# Patient Record
Sex: FEMALE | Race: WHITE | NOT HISPANIC OR LATINO | URBAN - METROPOLITAN AREA
[De-identification: names, ages, dates, MRNs, and addresses within clinical notes are randomized per-mention and may not be internally consistent; named-entity substitution may affect disease eponyms.]

---

## 2017-08-20 ENCOUNTER — EMERGENCY (EMERGENCY)
Facility: HOSPITAL | Age: 27
LOS: 1 days | End: 2017-08-20
Payer: MEDICAID

## 2017-08-20 PROCEDURE — 99282 EMERGENCY DEPT VISIT SF MDM: CPT | Mod: 25

## 2017-08-29 ENCOUNTER — EMERGENCY (EMERGENCY)
Facility: HOSPITAL | Age: 27
LOS: 1 days | End: 2017-08-29
Payer: MEDICAID

## 2017-08-29 PROCEDURE — 74020: CPT | Mod: 26

## 2017-08-29 PROCEDURE — 99284 EMERGENCY DEPT VISIT MOD MDM: CPT

## 2017-09-01 ENCOUNTER — OUTPATIENT (OUTPATIENT)
Dept: OUTPATIENT SERVICES | Facility: HOSPITAL | Age: 27
LOS: 1 days | End: 2017-09-01
Payer: MEDICAID

## 2017-09-20 DIAGNOSIS — R69 ILLNESS, UNSPECIFIED: ICD-10-CM

## 2017-10-01 PROCEDURE — G9001: CPT

## 2021-09-23 ENCOUNTER — EMERGENCY (EMERGENCY)
Facility: HOSPITAL | Age: 31
LOS: 1 days | End: 2021-09-23
Admitting: EMERGENCY MEDICINE
Payer: COMMERCIAL

## 2021-09-23 PROCEDURE — 99284 EMERGENCY DEPT VISIT MOD MDM: CPT

## 2021-09-23 PROCEDURE — 71046 X-RAY EXAM CHEST 2 VIEWS: CPT | Mod: 26

## 2021-09-23 PROCEDURE — 93010 ELECTROCARDIOGRAM REPORT: CPT

## 2021-09-30 ENCOUNTER — APPOINTMENT (OUTPATIENT)
Dept: CARDIOLOGY | Facility: CLINIC | Age: 31
End: 2021-09-30
Payer: COMMERCIAL

## 2021-09-30 VITALS
HEIGHT: 62 IN | HEART RATE: 72 BPM | DIASTOLIC BLOOD PRESSURE: 70 MMHG | BODY MASS INDEX: 27.6 KG/M2 | SYSTOLIC BLOOD PRESSURE: 98 MMHG | WEIGHT: 150 LBS | OXYGEN SATURATION: 96 %

## 2021-09-30 VITALS — SYSTOLIC BLOOD PRESSURE: 92 MMHG | DIASTOLIC BLOOD PRESSURE: 68 MMHG

## 2021-09-30 DIAGNOSIS — Z78.9 OTHER SPECIFIED HEALTH STATUS: ICD-10-CM

## 2021-09-30 DIAGNOSIS — Z82.49 FAMILY HISTORY OF ISCHEMIC HEART DISEASE AND OTHER DISEASES OF THE CIRCULATORY SYSTEM: ICD-10-CM

## 2021-09-30 DIAGNOSIS — Z87.891 PERSONAL HISTORY OF NICOTINE DEPENDENCE: ICD-10-CM

## 2021-09-30 PROBLEM — Z00.00 ENCOUNTER FOR PREVENTIVE HEALTH EXAMINATION: Status: ACTIVE | Noted: 2021-09-30

## 2021-09-30 PROCEDURE — 99204 OFFICE O/P NEW MOD 45 MIN: CPT

## 2021-09-30 NOTE — HISTORY OF PRESENT ILLNESS
[FreeTextEntry1] : The patient is 31 years old female who had a recent admission to emergency room with near syncope.  She did not lose her consciousness.  She was observed for a few hours.  There was no evidence of acute coronary syndrome.  There was no evidence of any cardiac arrhythmia.  She has a history of cardiac murmur.  She has a history of congenital heart disease.  According to patient she had aortic stenosis as a child and she underwent some procedure for the valve with the balloon.  She is not sure what kind of procedure it was.  She did not have aortic valve replacement.  She is physically active but not exercising on a regular basis.  No history of syncope.  She denies any chest pains or shortness of breath.

## 2021-09-30 NOTE — ASSESSMENT
[FreeTextEntry1] : Recent or near syncope.  She is running low blood pressure.  Could be orthostatic.  Increase hydration discussed with the patient.  Significant murmur on physical exam.  She has a history of congenital heart disease and underwent questionable procedure at age 11.  We will obtain her records from previous cardiologist and review.  Hospital records were reviewed.  I recommend echocardiogram to reevaluate valvular heart disease and overall ejection fraction.  Follow-up after echocardiogram.

## 2021-09-30 NOTE — PHYSICAL EXAM
[Well Developed] : well developed [Well Nourished] : well nourished [No Acute Distress] : no acute distress [Normal Conjunctiva] : normal conjunctiva [Normal Venous Pressure] : normal venous pressure [No Carotid Bruit] : no carotid bruit [Normal S1, S2] : normal S1, S2 [No Rub] : no rub [No Gallop] : no gallop [Clear Lung Fields] : clear lung fields [Good Air Entry] : good air entry [No Respiratory Distress] : no respiratory distress  [Soft] : abdomen soft [Non Tender] : non-tender [No Masses/organomegaly] : no masses/organomegaly [Normal Bowel Sounds] : normal bowel sounds [Normal Gait] : normal gait [No Edema] : no edema [No Cyanosis] : no cyanosis [No Clubbing] : no clubbing [No Varicosities] : no varicosities [No Rash] : no rash [No Skin Lesions] : no skin lesions [Moves all extremities] : moves all extremities [No Focal Deficits] : no focal deficits [Normal Speech] : normal speech [Alert and Oriented] : alert and oriented [Normal memory] : normal memory [Murmur] : murmur [de-identified] : Systolic ejection murmur.

## 2021-10-18 ENCOUNTER — APPOINTMENT (OUTPATIENT)
Dept: CARDIOLOGY | Facility: CLINIC | Age: 31
End: 2021-10-18
Payer: COMMERCIAL

## 2021-10-18 VITALS
WEIGHT: 150 LBS | HEART RATE: 64 BPM | HEIGHT: 62 IN | SYSTOLIC BLOOD PRESSURE: 108 MMHG | BODY MASS INDEX: 27.6 KG/M2 | OXYGEN SATURATION: 99 % | DIASTOLIC BLOOD PRESSURE: 70 MMHG

## 2021-10-18 DIAGNOSIS — R55 SYNCOPE AND COLLAPSE: ICD-10-CM

## 2021-10-18 DIAGNOSIS — R01.1 CARDIAC MURMUR, UNSPECIFIED: ICD-10-CM

## 2021-10-18 PROCEDURE — 93306 TTE W/DOPPLER COMPLETE: CPT

## 2021-10-18 PROCEDURE — 99215 OFFICE O/P EST HI 40 MIN: CPT

## 2021-10-18 NOTE — PHYSICAL EXAM

## 2021-10-18 NOTE — ASSESSMENT
[FreeTextEntry1] : Recent or near syncope.  She is running low blood pressure.  Could be orthostatic.  Increase hydration discussed with the patient.  Significant murmur on physical exam.  She has a history of congenital heart disease and underwent questionable procedure at age 11.  We will obtain her records from previous cardiologist and review.  Hospital records were reviewed. \par Today's echocardiogram was reviewed.  She does have bicuspid aortic valve with severe aortic stenosis.  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  There is no evidence of any significant aortic insufficiency.  Estimated valve area is 0.7 cm² with a peak gradient 95 mmHg.  I discussed further options regarding her aortic stenosis.  She already had symptoms of near syncope.  She needs to have aortic valve replaced.  The options will be decided by cardiothoracic surgeon and the patient.  She is referred for left heart cardiac catheterization prior to surgical evaluation.\par

## 2021-10-19 ENCOUNTER — NON-APPOINTMENT (OUTPATIENT)
Age: 31
End: 2021-10-19

## 2021-10-25 ENCOUNTER — APPOINTMENT (OUTPATIENT)
Dept: DISASTER EMERGENCY | Facility: CLINIC | Age: 31
End: 2021-10-25

## 2021-10-25 ENCOUNTER — OUTPATIENT (OUTPATIENT)
Dept: OUTPATIENT SERVICES | Facility: HOSPITAL | Age: 31
LOS: 1 days | End: 2021-10-25

## 2021-10-26 LAB — SARS-COV-2 N GENE NPH QL NAA+PROBE: NOT DETECTED

## 2021-10-28 ENCOUNTER — OUTPATIENT (OUTPATIENT)
Dept: OUTPATIENT SERVICES | Facility: HOSPITAL | Age: 31
LOS: 1 days | End: 2021-10-28
Payer: COMMERCIAL

## 2021-10-28 PROCEDURE — 93454 CORONARY ARTERY ANGIO S&I: CPT | Mod: 26

## 2021-10-28 PROCEDURE — 93010 ELECTROCARDIOGRAM REPORT: CPT

## 2021-11-01 ENCOUNTER — APPOINTMENT (OUTPATIENT)
Dept: CARDIOTHORACIC SURGERY | Facility: CLINIC | Age: 31
End: 2021-11-01
Payer: COMMERCIAL

## 2021-11-01 ENCOUNTER — APPOINTMENT (OUTPATIENT)
Dept: CARDIOLOGY | Facility: CLINIC | Age: 31
End: 2021-11-01

## 2021-11-01 VITALS
DIASTOLIC BLOOD PRESSURE: 62 MMHG | HEIGHT: 62 IN | HEART RATE: 63 BPM | BODY MASS INDEX: 27.23 KG/M2 | TEMPERATURE: 97.3 F | WEIGHT: 148 LBS | SYSTOLIC BLOOD PRESSURE: 104 MMHG | OXYGEN SATURATION: 99 %

## 2021-11-01 PROCEDURE — 99205 OFFICE O/P NEW HI 60 MIN: CPT

## 2021-11-01 NOTE — REVIEW OF SYSTEMS
[Fever] : no fever [Chills] : no chills [Feeling Poorly] : not feeling poorly [Feeling Tired] : feeling tired [Heart Rate Is Slow] : the heart rate was not slow [Heart Rate Is Fast] : the heart rate was not fast [Chest Pain] : no chest pain [Palpitations] : palpitations [Leg Claudication] : no intermittent leg claudication [Lower Ext Edema] : no lower extremity edema [Suicidal] : not suicidal [Sleep Disturbances] : no sleep disturbances [Anxiety] : no anxiety [Depression] : depression [Change In Personality] : no personality change [Emotional Problems] : no emotional problems [Negative] : Heme/Lymph

## 2021-11-01 NOTE — PHYSICAL EXAM
[General Appearance - Alert] : alert [General Appearance - In No Acute Distress] : in no acute distress [Sclera] : the sclera and conjunctiva were normal [PERRL With Normal Accommodation] : pupils were equal in size, round, and reactive to light [Extraocular Movements] : extraocular movements were intact [Outer Ear] : the ears and nose were normal in appearance [Oropharynx] : the oropharynx was normal [Neck Appearance] : the appearance of the neck was normal [Neck Cervical Mass (___cm)] : no neck mass was observed [Jugular Venous Distention Increased] : there was no jugular-venous distention [Thyroid Diffuse Enlargement] : the thyroid was not enlarged [Thyroid Nodule] : there were no palpable thyroid nodules [Auscultation Breath Sounds / Voice Sounds] : lungs were clear to auscultation bilaterally [Apical Impulse] : the apical impulse was normal [Heart Rate And Rhythm] : heart rate was normal and rhythm regular [Heart Sounds Gallop] : no gallops [Heart Sounds Pericardial Friction Rub] : no pericardial rub [FreeTextEntry1] : normal S1, absent S2. Harsh systolic murmur at the cardiac base [Examination Of The Chest] : the chest was normal in appearance [Right Carotid Bruit] : no bruit heard over the right carotid [Left Carotid Bruit] : no bruit heard over the left carotid [Right Femoral Bruit] : no bruit heard over the right femoral artery [Left Femoral Bruit] : no bruit heard over the left femoral artery [2+] : left 2+ [No Abnormalities] : the abdominal aorta was not enlarged and no bruit was heard [Varicose Veins Of The Right Leg] : the patient has no varicose veins of the right leg [Varicose Veins Of The Left Leg] : the patient has no varicose veins of the left leg [Bowel Sounds] : normal bowel sounds [Abdomen Soft] : soft [Abdomen Tenderness] : non-tender [Abdomen Mass (___ Cm)] : no abdominal mass palpated [Cervical Lymph Nodes Enlarged Posterior Bilaterally] : posterior cervical [Cervical Lymph Nodes Enlarged Anterior Bilaterally] : anterior cervical [Supraclavicular Lymph Nodes Enlarged Bilaterally] : supraclavicular [Axillary Lymph Nodes Enlarged Bilaterally] : axillary [Femoral Lymph Nodes Enlarged Bilaterally] : femoral [Inguinal Lymph Nodes Enlarged Bilaterally] : inguinal [Abnormal Walk] : normal gait [Nail Clubbing] : no clubbing  or cyanosis of the fingernails [Musculoskeletal - Swelling] : no joint swelling seen [Motor Tone] : muscle strength and tone were normal [Skin Color & Pigmentation] : normal skin color and pigmentation [Skin Turgor] : normal skin turgor [] : no rash

## 2021-11-01 NOTE — HISTORY OF PRESENT ILLNESS
[FreeTextEntry1] : Ms. Olivares is a 31 year old female with a PMH including Congential Heart Disease, Murmur, near Syncope, and Severe Aortic Stenosis. \par \par The patient is a very pleasant 31-year-old female.  She states that at the age of 10 she underwent open heart surgery and "some valve procedure.".  Her mother has since passed, and she has no medical records pertaining to the surgery.  She is quite sure however that she did not undergo valve replacement, and she is aware of having a bicuspid aortic valve.  She also reports that she knew "all along that would come to this."  The patient denies exertional dyspnea.  She is a  and walks routinely throughout the day.  She also is very active in his mother, and has a 3-year-old as well as a 4-year-old.  One child has an issue with her pulmonic valve.  The child is doing well however and it "just needs to be monitored."  The patient denies hypertension or hyperlipidemia.  She is a nondiabetic.  She quit smoking 1-1/2 months ago.  She is now experiencing intermittent episodes of near syncope and palpitations.\par \par Echocardiogram was performed on October 18, 2021.  The study was personally reviewed.  The aortic valve was bicuspid with severe stenosis.  Aortic valve area 0.7 cm².  Peak and mean gradients are 113 and 55 mmHg respectively.  There is mild to moderate aortic valve regurgitation.  The mitral valve is normal with minimal regurgitation.  Biventricular function is preserved.  The aortic root measures 3.5 cm and the ascending aorta measures 4.2.  The aortic arch is measured at 3.6 cm.\par \par Cardiac catheterization was performed on October 28, 2021.  The study was also personally reviewed.  The coronary anatomy is right dominant.  The right coronary artery is normal but small, the left-sided circulation shows no disease.  The valve was not crossed for the procedure and there were no gradients.\par \par The patient has not been seen by dentist for many years.  She denies issues with dentition.\par \par The patient's past medical history, past surgical history, family history, social history, allergies, medications, and multisystem review of systems were individually reviewed with the patient.  There are no pertinent additions or subtractions.  The patient was personally seen and examined.

## 2021-11-01 NOTE — ASSESSMENT
[FreeTextEntry1] : The patient is a very pleasant 31-year-old female.  She presents with critical, symptomatic, bicuspid aortic valve stenosis.  She reports that at age 10, she underwent an unknown valve procedure.  She does not have any operative records.  Fluoroscopy clearly show sternal wires.  Echocardiogram shows a valve area 0.7 cm² with a peak gradient of over 100 mmHg.  The planimetry numbers are not accurate.  Catheterization shows a right dominant system without disease.  Aortic valve replacement is clearly indicated.  However additional testing is required.\par \par In the setting of a bicuspid aortic valve, as well as the reoperative setting, a dedicated, gated CT angiogram of chest will be obtained with the assistance of Dr. Otoniel Martínez from radiology.  Aortic root and ascending measurements are essential.  In the setting of a bicuspid aortic valve in such a young patient, I suspect the ascending aorta may also require replacement.  If the echocardiogram measurement is accurate, the ascending aorta is 4.2 cm.  In a petite female in the setting of a bicuspid valve, and at age 31, this requires intervention.\par \par We will obtain the patient's operative records from [facility].  Reviewing his records it is imperative and we will make every effort to gain access to these records.\par \par I have asked the patient to be seen by her dentist for clearance.\par \par The patient would like to come in at the CT scan for further counseling and discussion.\par \par I had a lengthy conversation with the patient regarding the choice of aortic valve replacement.  She is only 31 years of age which is to avoid repeat intervention.  She is therefore requested a mechanical valve.  She fully understands this requires lifelong coumadinization, and that the Coumadin can only be discontinued by medical professional with a heparin bridge.  She also understands she cannot have additional children while on Coumadin.  She reports having a 3-year-old as well as a 4-year-old and, "I am done having children."  However she has not undergone tubal ligation and she remains for tile.  We will have this discussion again in the office when she returns.\par \par The operative procedure, and postoperative recovery were discussed in detail.\par \par Risks benefits and alternatives to transcatheter aortic valve placement were discussed with the patient in detail.  Risks discussed included, but not limited to infection, bleeding, myocardial infarction, cerebrovascular accident, renal failure, vascular injury requiring intervention, cardiac rupture, and death.  In addition, a roughly 5-10% risk of permanent pacemaker requirement was highlighted.   Furthermore, the patient's STS score and its significance were discussed directly with the patient and her boyfiend.\par \par I would like to thank you for referring this patient to my attention and for allowing me to participate in her care.  If there are any further questions, or I can be of any further assistance, please do not hesitate contacting me at any time.

## 2021-11-10 ENCOUNTER — APPOINTMENT (OUTPATIENT)
Dept: CARDIOTHORACIC SURGERY | Facility: CLINIC | Age: 31
End: 2021-11-10
Payer: COMMERCIAL

## 2021-11-10 ENCOUNTER — OUTPATIENT (OUTPATIENT)
Dept: OUTPATIENT SERVICES | Facility: HOSPITAL | Age: 31
LOS: 1 days | End: 2021-11-10
Payer: COMMERCIAL

## 2021-11-10 ENCOUNTER — RESULT REVIEW (OUTPATIENT)
Age: 31
End: 2021-11-10

## 2021-11-10 VITALS
DIASTOLIC BLOOD PRESSURE: 69 MMHG | SYSTOLIC BLOOD PRESSURE: 105 MMHG | RESPIRATION RATE: 16 BRPM | HEART RATE: 61 BPM | OXYGEN SATURATION: 98 %

## 2021-11-10 DIAGNOSIS — I35.0 NONRHEUMATIC AORTIC (VALVE) STENOSIS: ICD-10-CM

## 2021-11-10 PROCEDURE — 99215 OFFICE O/P EST HI 40 MIN: CPT

## 2021-11-10 PROCEDURE — 71275 CT ANGIOGRAPHY CHEST: CPT | Mod: 26

## 2021-11-10 PROCEDURE — 71275 CT ANGIOGRAPHY CHEST: CPT

## 2021-11-10 NOTE — ASSESSMENT
[FreeTextEntry1] : The patient is a very pleasant 31-year-old female.  Her operative report shows that in 2001 she underwent sternotomy and open aortic valve commissurotomy.  She now presents with critical aortic valve stenosis and a bicuspid aortic valve.  As suspected, the ascending aorta is dilated to 4 cm.  When compared to the descending thoracic aorta that measures only 2.1 cm, it is clearly dilated.  I therefore believe that in this 31-year-old female a sending aortic/hemiarch replacement is indicated at the time of surgery.  This was discussed in detail with the patient.  She understands.  I also had a lengthy conversation with the patient about mechanical valve choice versus biologic.  At 31 years of age status post tubal ligation, I strongly recommend a mechanical valve.  A bioprosthetic would not have a very long anticipated longevity and a 31-year-old patient.\par \par The patient reports dental sensitivity and requires dental clearance.  This was also discussed.  We will arrange a surgical date, and in the interim she will be seen by dentist.\par \par Risks benefits and alternatives to aortic valve replacement with ascending/hemiarch or placement were discussed with the patient in detail.  Risks discussed included, but not limited to infection, bleeding, myocardial infarction, cerebrovascular accident, renal failure, vascular injury requiring intervention, cardiac rupture, and death.  In addition, a roughly 5-10% risk of permanent pacemaker requirement was highlighted.   Furthermore, the patient's STS score and its significance were discussed directly with the patient and family.\par \par PLAN:\par - Dental Clearance\par - Pulmonary Function Testing \par - Aortic Valve Replacement \par

## 2021-11-10 NOTE — PHYSICAL EXAM
[Sclera] : the sclera and conjunctiva were normal [PERRL With Normal Accommodation] : pupils were equal in size, round, and reactive to light [Extraocular Movements] : extraocular movements were intact [Neck Appearance] : the appearance of the neck was normal [Neck Cervical Mass (___cm)] : no neck mass was observed [Jugular Venous Distention Increased] : there was no jugular-venous distention [Thyroid Diffuse Enlargement] : the thyroid was not enlarged [Thyroid Nodule] : there were no palpable thyroid nodules [Auscultation Breath Sounds / Voice Sounds] : lungs were clear to auscultation bilaterally [Apical Impulse] : the apical impulse was normal [Heart Rate And Rhythm] : heart rate was normal and rhythm regular [Heart Sounds Gallop] : no gallops [Heart Sounds Pericardial Friction Rub] : no pericardial rub [FreeTextEntry1] : normal S1, absent S2. Harsh systolic murmur at the cardiac base [Examination Of The Chest] : the chest was normal in appearance [Right Carotid Bruit] : no bruit heard over the right carotid [Left Carotid Bruit] : no bruit heard over the left carotid [Right Femoral Bruit] : no bruit heard over the right femoral artery [Left Femoral Bruit] : no bruit heard over the left femoral artery [2+] : left 2+ [No Abnormalities] : the abdominal aorta was not enlarged and no bruit was heard [Varicose Veins Of The Right Leg] : the patient has no varicose veins of the right leg [Varicose Veins Of The Left Leg] : the patient has no varicose veins of the left leg [Bowel Sounds] : normal bowel sounds [Abdomen Soft] : soft [Abdomen Tenderness] : non-tender [Abdomen Mass (___ Cm)] : no abdominal mass palpated [Cervical Lymph Nodes Enlarged Posterior Bilaterally] : posterior cervical [Cervical Lymph Nodes Enlarged Anterior Bilaterally] : anterior cervical [Supraclavicular Lymph Nodes Enlarged Bilaterally] : supraclavicular [Axillary Lymph Nodes Enlarged Bilaterally] : axillary [Femoral Lymph Nodes Enlarged Bilaterally] : femoral [Inguinal Lymph Nodes Enlarged Bilaterally] : inguinal [Abnormal Walk] : normal gait [Nail Clubbing] : no clubbing  or cyanosis of the fingernails [Musculoskeletal - Swelling] : no joint swelling seen [Motor Tone] : muscle strength and tone were normal [Skin Color & Pigmentation] : normal skin color and pigmentation [Skin Turgor] : normal skin turgor [] : no rash

## 2021-11-10 NOTE — HISTORY OF PRESENT ILLNESS
[FreeTextEntry1] : Ms. Olivares is a 31 year old female with a PMH including Congential Heart Disease, Murmur, near Syncope, and Severe Aortic Stenosis. \par \par The patient is a very pleasant 31-year-old female. She states that at the age of 10 she underwent open heart surgery and "some valve procedure.". Her mother has since passed, and she has no medical records pertaining to the surgery. She is quite sure however that she did not undergo valve replacement, and she is aware of having a bicuspid aortic valve. She also reports that she knew "all along that would come to this." The patient denies exertional dyspnea. She is a  and walks routinely throughout the day. She also is very active in his mother, and has a 3-year-old as well as a 4-year-old. One child has an issue with her pulmonic valve. The child is doing well however and it "just needs to be monitored." The patient denies hypertension or hyperlipidemia. She is a nondiabetic. She quit smoking 1-1/2 months ago. She is now experiencing intermittent episodes of near syncope and palpitations.\par \par Echocardiogram was performed on October 18, 2021. The study was personally reviewed. The aortic valve was bicuspid with severe stenosis. Aortic valve area 0.7 cm². Peak and mean gradients are 113 and 55 mmHg respectively. There is mild to moderate aortic valve regurgitation. The mitral valve is normal with minimal regurgitation. Biventricular function is preserved. The aortic root measures 3.5 cm and the ascending aorta measures 4.2. The aortic arch is measured at 3.6 cm.\par \par Cardiac catheterization was performed on October 28, 2021. The study was also personally reviewed. The coronary anatomy is right dominant. The right coronary artery is normal but small, the left-sided circulation shows no disease. The valve was not crossed for the procedure and there were no gradients.\par \par CT angiogram performed today November 10, 2021 was personally reviewed.  The ascending aorta measures 4 cm the level of the main pulmonary artery.  The aorta narrows down to 3.2 cm at the sinotubular junction.  The aortic sinuses measure 3.0 x 2.6 cm and therefore not dilated.  The descending thoracic aorta measures 2.1 cm in maximal diameter.\par \par Since my initial visit with the patient a little little over a week ago, my office was able to obtain her original operative report from February 15, 2001.  This report was reviewed.  The patient underwent an open aortic valvuloplasty under cardiopulmonary bypass.  Standard cannulation was utilized, and total cross-clamp time was 22 minutes.  The dictation shows a totally uneventful operation.\par \par Also since last seeing the patient, she reports having some dental sensitivity.  However she has not seen a dentist as of yet.  She is also now contemplating mechanical valve versus bioprosthetic.  She also further reported to me that previously when she said she was "done with having children," she failed to report that she is also status post tubal ligation.\par \par The patient's past medical history, past surgical history, family history, social history, allergies, medications, and multisystem review of systems were individually reviewed with the patient.  There are no pertinent additions or subtractions.  The patient was personally seen and examined.\par

## 2021-11-18 ENCOUNTER — RESULT REVIEW (OUTPATIENT)
Age: 31
End: 2021-11-18

## 2021-11-18 ENCOUNTER — OUTPATIENT (OUTPATIENT)
Dept: OUTPATIENT SERVICES | Facility: HOSPITAL | Age: 31
LOS: 1 days | End: 2021-11-18
Payer: COMMERCIAL

## 2021-11-18 VITALS
OXYGEN SATURATION: 98 % | HEIGHT: 62 IN | HEART RATE: 66 BPM | RESPIRATION RATE: 20 BRPM | DIASTOLIC BLOOD PRESSURE: 73 MMHG | SYSTOLIC BLOOD PRESSURE: 106 MMHG | WEIGHT: 150.58 LBS | TEMPERATURE: 97 F

## 2021-11-18 DIAGNOSIS — Z98.891 HISTORY OF UTERINE SCAR FROM PREVIOUS SURGERY: Chronic | ICD-10-CM

## 2021-11-18 DIAGNOSIS — I71.2 THORACIC AORTIC ANEURYSM, WITHOUT RUPTURE: ICD-10-CM

## 2021-11-18 DIAGNOSIS — Z29.9 ENCOUNTER FOR PROPHYLACTIC MEASURES, UNSPECIFIED: ICD-10-CM

## 2021-11-18 DIAGNOSIS — Q23.0 CONGENITAL STENOSIS OF AORTIC VALVE: ICD-10-CM

## 2021-11-18 DIAGNOSIS — Z98.890 OTHER SPECIFIED POSTPROCEDURAL STATES: Chronic | ICD-10-CM

## 2021-11-18 DIAGNOSIS — Z01.818 ENCOUNTER FOR OTHER PREPROCEDURAL EXAMINATION: ICD-10-CM

## 2021-11-18 DIAGNOSIS — Z13.89 ENCOUNTER FOR SCREENING FOR OTHER DISORDER: ICD-10-CM

## 2021-11-18 DIAGNOSIS — Z98.51 TUBAL LIGATION STATUS: Chronic | ICD-10-CM

## 2021-11-18 LAB
A1C WITH ESTIMATED AVERAGE GLUCOSE RESULT: 5 % — SIGNIFICANT CHANGE UP (ref 4–5.6)
ALBUMIN SERPL ELPH-MCNC: 4.3 G/DL — SIGNIFICANT CHANGE UP (ref 3.3–5.2)
ALP SERPL-CCNC: 59 U/L — SIGNIFICANT CHANGE UP (ref 40–120)
ALT FLD-CCNC: 16 U/L — SIGNIFICANT CHANGE UP
ANION GAP SERPL CALC-SCNC: 11 MMOL/L — SIGNIFICANT CHANGE UP (ref 5–17)
APPEARANCE UR: CLEAR — SIGNIFICANT CHANGE UP
APTT BLD: 28.8 SEC — SIGNIFICANT CHANGE UP (ref 27.5–35.5)
AST SERPL-CCNC: 17 U/L — SIGNIFICANT CHANGE UP
BACTERIA # UR AUTO: ABNORMAL
BASOPHILS # BLD AUTO: 0.03 K/UL — SIGNIFICANT CHANGE UP (ref 0–0.2)
BASOPHILS NFR BLD AUTO: 0.5 % — SIGNIFICANT CHANGE UP (ref 0–2)
BILIRUB SERPL-MCNC: <0.2 MG/DL — LOW (ref 0.4–2)
BILIRUB UR-MCNC: NEGATIVE — SIGNIFICANT CHANGE UP
BLD GP AB SCN SERPL QL: SIGNIFICANT CHANGE UP
BUN SERPL-MCNC: 7.2 MG/DL — LOW (ref 8–20)
CALCIUM SERPL-MCNC: 8.6 MG/DL — SIGNIFICANT CHANGE UP (ref 8.6–10.2)
CHLORIDE SERPL-SCNC: 104 MMOL/L — SIGNIFICANT CHANGE UP (ref 98–107)
CO2 SERPL-SCNC: 23 MMOL/L — SIGNIFICANT CHANGE UP (ref 22–29)
COLOR SPEC: SIGNIFICANT CHANGE UP
CREAT SERPL-MCNC: 0.48 MG/DL — LOW (ref 0.5–1.3)
DIFF PNL FLD: ABNORMAL
EOSINOPHIL # BLD AUTO: 0 K/UL — SIGNIFICANT CHANGE UP (ref 0–0.5)
EOSINOPHIL NFR BLD AUTO: 0 % — SIGNIFICANT CHANGE UP (ref 0–6)
EPI CELLS # UR: SIGNIFICANT CHANGE UP
ESTIMATED AVERAGE GLUCOSE: 97 MG/DL — SIGNIFICANT CHANGE UP (ref 68–114)
GLUCOSE SERPL-MCNC: 148 MG/DL — HIGH (ref 70–99)
GLUCOSE UR QL: NEGATIVE MG/DL — SIGNIFICANT CHANGE UP
HCG SERPL-ACNC: <4 MIU/ML — SIGNIFICANT CHANGE UP
HCT VFR BLD CALC: 36.6 % — SIGNIFICANT CHANGE UP (ref 34.5–45)
HGB BLD-MCNC: 11.9 G/DL — SIGNIFICANT CHANGE UP (ref 11.5–15.5)
IMM GRANULOCYTES NFR BLD AUTO: 0.2 % — SIGNIFICANT CHANGE UP (ref 0–1.5)
INR BLD: 1.01 RATIO — SIGNIFICANT CHANGE UP (ref 0.88–1.16)
KETONES UR-MCNC: NEGATIVE — SIGNIFICANT CHANGE UP
LEUKOCYTE ESTERASE UR-ACNC: NEGATIVE — SIGNIFICANT CHANGE UP
LYMPHOCYTES # BLD AUTO: 1.37 K/UL — SIGNIFICANT CHANGE UP (ref 1–3.3)
LYMPHOCYTES # BLD AUTO: 21.9 % — SIGNIFICANT CHANGE UP (ref 13–44)
MCHC RBC-ENTMCNC: 29.8 PG — SIGNIFICANT CHANGE UP (ref 27–34)
MCHC RBC-ENTMCNC: 32.5 GM/DL — SIGNIFICANT CHANGE UP (ref 32–36)
MCV RBC AUTO: 91.7 FL — SIGNIFICANT CHANGE UP (ref 80–100)
MONOCYTES # BLD AUTO: 0.52 K/UL — SIGNIFICANT CHANGE UP (ref 0–0.9)
MONOCYTES NFR BLD AUTO: 8.3 % — SIGNIFICANT CHANGE UP (ref 2–14)
MRSA PCR RESULT.: SIGNIFICANT CHANGE UP
NEUTROPHILS # BLD AUTO: 4.32 K/UL — SIGNIFICANT CHANGE UP (ref 1.8–7.4)
NEUTROPHILS NFR BLD AUTO: 69.1 % — SIGNIFICANT CHANGE UP (ref 43–77)
NITRITE UR-MCNC: NEGATIVE — SIGNIFICANT CHANGE UP
NT-PROBNP SERPL-SCNC: 236 PG/ML — SIGNIFICANT CHANGE UP (ref 0–300)
PH UR: 7 — SIGNIFICANT CHANGE UP (ref 5–8)
PLATELET # BLD AUTO: 250 K/UL — SIGNIFICANT CHANGE UP (ref 150–400)
POTASSIUM SERPL-MCNC: 3.9 MMOL/L — SIGNIFICANT CHANGE UP (ref 3.5–5.3)
POTASSIUM SERPL-SCNC: 3.9 MMOL/L — SIGNIFICANT CHANGE UP (ref 3.5–5.3)
PREALB SERPL-MCNC: 22 MG/DL — SIGNIFICANT CHANGE UP (ref 18–38)
PROT SERPL-MCNC: 7.4 G/DL — SIGNIFICANT CHANGE UP (ref 6.6–8.7)
PROT UR-MCNC: 15 MG/DL
PROTHROM AB SERPL-ACNC: 11.7 SEC — SIGNIFICANT CHANGE UP (ref 10.6–13.6)
RBC # BLD: 3.99 M/UL — SIGNIFICANT CHANGE UP (ref 3.8–5.2)
RBC # FLD: 12 % — SIGNIFICANT CHANGE UP (ref 10.3–14.5)
RBC CASTS # UR COMP ASSIST: SIGNIFICANT CHANGE UP /HPF (ref 0–4)
S AUREUS DNA NOSE QL NAA+PROBE: DETECTED
SODIUM SERPL-SCNC: 138 MMOL/L — SIGNIFICANT CHANGE UP (ref 135–145)
SP GR SPEC: 1 — LOW (ref 1.01–1.02)
T3 SERPL-MCNC: 120 NG/DL — SIGNIFICANT CHANGE UP (ref 80–200)
T4 AB SER-ACNC: 7 UG/DL — SIGNIFICANT CHANGE UP (ref 4.5–12)
TSH SERPL-MCNC: 1.61 UIU/ML — SIGNIFICANT CHANGE UP (ref 0.27–4.2)
UROBILINOGEN FLD QL: NEGATIVE MG/DL — SIGNIFICANT CHANGE UP
WBC # BLD: 6.25 K/UL — SIGNIFICANT CHANGE UP (ref 3.8–10.5)
WBC # FLD AUTO: 6.25 K/UL — SIGNIFICANT CHANGE UP (ref 3.8–10.5)
WBC UR QL: SIGNIFICANT CHANGE UP

## 2021-11-18 PROCEDURE — 71046 X-RAY EXAM CHEST 2 VIEWS: CPT

## 2021-11-18 PROCEDURE — 71046 X-RAY EXAM CHEST 2 VIEWS: CPT | Mod: 26

## 2021-11-18 PROCEDURE — G0463: CPT

## 2021-11-18 PROCEDURE — 93010 ELECTROCARDIOGRAM REPORT: CPT

## 2021-11-18 PROCEDURE — 93005 ELECTROCARDIOGRAM TRACING: CPT

## 2021-11-18 RX ORDER — INFLUENZA VIRUS VACCINE 15; 15; 15; 15 UG/.5ML; UG/.5ML; UG/.5ML; UG/.5ML
0.5 SUSPENSION INTRAMUSCULAR ONCE
Refills: 0 | Status: COMPLETED | OUTPATIENT
Start: 2021-11-18 | End: 2021-11-18

## 2021-11-18 RX ORDER — SODIUM CHLORIDE 9 MG/ML
3 INJECTION INTRAMUSCULAR; INTRAVENOUS; SUBCUTANEOUS EVERY 8 HOURS
Refills: 0 | Status: DISCONTINUED | OUTPATIENT
Start: 2021-11-30 | End: 2021-12-09

## 2021-11-18 NOTE — H&P PST ADULT - LAB RESULTS AND INTERPRETATION
abnormal labs reported to TA Grace. Mary Carmen De Souza NP abnormal labs reported to TA Grace. Mary Carmen De Souza NP  11/19/21 13:53 MRSA/MSSA result noted as documented, Ta Grace made aware, urine culture pending. Kayla MS, FNP-BC

## 2021-11-18 NOTE — H&P PST ADULT - ASSESSMENT
32 yo F  LMP PMH of Congential Heart Disease, Bicuspid Aortic Valve, Murmur, near Syncope, and Severe Aortic Stenosis. At the age of 10 patient underwent an open aortic valvuloplasty under cardiopulmonary bypass on 2/15/2001. Patient is now experiencing intermittent episodes of near syncope and palpitations. She denies exertional dyspnea. She is a  and walks routinely throughout the day. She is very active. She quit smoking 1-1/2 months ago. Preop assessment prior to reoperation sternotomy, aortic valve replacement, ascending hemiarch replacement w/Dr Munguia scheduled for 2021    Pt was educated on preop preparation with written and verbal instructions. Pt was educated on NSAIDs, multivitamins and herbals that increase the risk of bleeding and need to be stopped 7 days before procedure. Pt was educated on covid testing and covid prevention, i.e. social distancing, handwashing, mask wearing. Pt verbalized understanding of the above.     OPIOID RISK TOOL    MARGARITA EACH BOX THAT APPLIES AND ADD TOTALS AT THE END    FAMILY HISTORY OF SUBSTANCE ABUSE                 FEMALE         MALE                                                Alcohol                             [  ]1 pt          [  ]3pts                                               Illegal Durgs                     [  ]2 pts        [  ]3pts                                               Rx Drugs                           [  ]4 pts        [  ]4 pts    PERSONAL HISTORY OF SUBSTANCE ABUSE                                                                                          Alcohol                             [  ]3 pts       [  ]3 pts                                               Illegal Drugs                     [  ]4 pts        [  ]4 pts                                               Rx Drugs                           [  ]5 pts        [  ]5 pts    AGE BETWEEN 16-45 YEARS                                      [ x ]1 pt         [  ]1 pt    HISTORY OF PREADOLESCENT   SEXUAL ABUSE                                                             [  ]3 pts        [  ]0pts    PSYCHOLOGICAL DISEASE                     ADD, OCD, Bipolar, Schizophrenia        [  ]2 pts         [  ]2 pts                      Depression                                               [  ]1 pt           [  ]1 pt           SCORING TOTAL   (add numbers and type here)              ( 1 )                                     A score of 3 or lower indicated LOW risk for future opioid abuse  A score of 4 to 7 indicated moderate risk for future opioid abuse  A score of 8 or higher indicates a high risk for opioid abuse    CAPRINI VTE 2.0 SCORE [CLOT updated 2019]    AGE RELATED RISK FACTORS                                                       MOBILITY RELATED FACTORS  [ ] Age 41-60 years                                            (1 Point)                    [ ] Bed rest                                                        (1 Point)  [ ] Age: 61-74 years                                           (2 Points)                  [ ] Plaster cast                                                   (2 Points)  [ ] Age= 75 years                                              (3 Points)                    [ ] Bed bound for more than 72 hours                 (2 Points)    DISEASE RELATED RISK FACTORS                                               GENDER SPECIFIC FACTORS  [ ] Edema in the lower extremities                       (1 Point)              [ ] Pregnancy                                                     (1 Point)  [ ] Varicose veins                                               (1 Point)                     [ ] Post-partum < 6 weeks                                   (1 Point)             [ ] BMI > 25 Kg/m2                                            (1 Point)                     [ ] Hormonal therapy  or oral contraception          (1 Point)                 [ ] Sepsis (in the previous month)                        (1 Point)               [ ] History of pregnancy complications                 (1 point)  [ ] Pneumonia or serious lung disease                                               [ ] Unexplained or recurrent                     (1 Point)           (in the previous month)                               (1 Point)  [ ] Abnormal pulmonary function test                     (1 Point)                 SURGERY RELATED RISK FACTORS  [ ] Acute myocardial infarction                              (1 Point)               [ ]  Section                                             (1 Point)  [ ] Congestive heart failure (in the previous month)  (1 Point)      [ ] Minor surgery                                                  (1 Point)   [ ] Inflammatory bowel disease                             (1 Point)               [ ] Arthroscopic surgery                                        (2 Points)  [ ] Central venous access                                      (2 Points)                [ x] General surgery lasting more than 45 minutes (2 points)  [ ] Malignancy- Present or previous                   (2 Points)                [ ] Elective arthroplasty                                         (5 points)    [ ] Stroke (in the previous month)                          (5 Points)                                                                                                                                                           HEMATOLOGY RELATED FACTORS                                                 TRAUMA RELATED RISK FACTORS  [ ] Prior episodes of VTE                                     (3 Points)                [ ] Fracture of the hip, pelvis, or leg                       (5 Points)  [ ] Positive family history for VTE                         (3 Points)             [ ] Acute spinal cord injury (in the previous month)  (5 Points)  [ ] Prothrombin 00559 A                                     (3 Points)               [ ] Paralysis  (less than 1 month)                             (5 Points)  [ ] Factor V Leiden                                             (3 Points)                  [ ] Multiple Trauma within 1 month                        (5 Points)  [ ] Lupus anticoagulants                                     (3 Points)                                                           [ ] Anticardiolipin antibodies                               (3 Points)                                                       [ ] High homocysteine in the blood                      (3 Points)                                             [ ] Other congenital or acquired thrombophilia      (3 Points)                                                [ ] Heparin induced thrombocytopenia                  (3 Points)                                     Total Score [      2    ] 32 yo F   x2 LMP 10/2021 PMH of Congential Heart Disease, Bicuspid Aortic Valve, Murmur, near Syncope, and Severe Aortic Stenosis. At the age of 10 patient underwent an open aortic valvuloplasty under cardiopulmonary bypass on 2/15/2001. Patient is now experiencing intermittent episodes of near syncope and palpitations. She denies exertional dyspnea. She is a  and walks routinely throughout the day. She quit smoking 1-1/2 months ago. Preop assessment prior to reoperation sternotomy, aortic valve replacement, ascending hemiarch replacement w/Dr Munguia scheduled for 2021    Pt was educated on preop preparation with written and verbal instructions. Pt was educated on NSAIDs, multivitamins and herbals that increase the risk of bleeding and need to be stopped 7 days before procedure. Pt was educated on covid testing and covid prevention, i.e. social distancing, handwashing, mask wearing. Pt verbalized understanding of the above.     OPIOID RISK TOOL    MARGARITA EACH BOX THAT APPLIES AND ADD TOTALS AT THE END    FAMILY HISTORY OF SUBSTANCE ABUSE                 FEMALE         MALE                                                Alcohol                             [  ]1 pt          [  ]3pts                                               Illegal Durgs                     [  ]2 pts        [  ]3pts                                               Rx Drugs                           [  ]4 pts        [  ]4 pts    PERSONAL HISTORY OF SUBSTANCE ABUSE                                                                                          Alcohol                             [  ]3 pts       [  ]3 pts                                               Illegal Drugs                     [  ]4 pts        [  ]4 pts                                               Rx Drugs                           [  ]5 pts        [  ]5 pts    AGE BETWEEN 16-45 YEARS                                      [ x ]1 pt         [  ]1 pt    HISTORY OF PREADOLESCENT   SEXUAL ABUSE                                                             [  ]3 pts        [  ]0pts    PSYCHOLOGICAL DISEASE                     ADD, OCD, Bipolar, Schizophrenia        [  ]2 pts         [  ]2 pts                      Depression                                               [  ]1 pt           [  ]1 pt           SCORING TOTAL   (add numbers and type here)              ( 1 )                                     A score of 3 or lower indicated LOW risk for future opioid abuse  A score of 4 to 7 indicated moderate risk for future opioid abuse  A score of 8 or higher indicates a high risk for opioid abuse    CAPRINI VTE 2.0 SCORE [CLOT updated 2019]    AGE RELATED RISK FACTORS                                                       MOBILITY RELATED FACTORS  [ ] Age 41-60 years                                            (1 Point)                    [ ] Bed rest                                                        (1 Point)  [ ] Age: 61-74 years                                           (2 Points)                  [ ] Plaster cast                                                   (2 Points)  [ ] Age= 75 years                                              (3 Points)                    [ ] Bed bound for more than 72 hours                 (2 Points)    DISEASE RELATED RISK FACTORS                                               GENDER SPECIFIC FACTORS  [ ] Edema in the lower extremities                       (1 Point)              [ ] Pregnancy                                                     (1 Point)  [ ] Varicose veins                                               (1 Point)                     [ ] Post-partum < 6 weeks                                   (1 Point)             [ ] BMI > 25 Kg/m2                                            (1 Point)                     [ ] Hormonal therapy  or oral contraception          (1 Point)                 [ ] Sepsis (in the previous month)                        (1 Point)               [ ] History of pregnancy complications                 (1 point)  [ ] Pneumonia or serious lung disease                                               [ ] Unexplained or recurrent                     (1 Point)           (in the previous month)                               (1 Point)  [ ] Abnormal pulmonary function test                     (1 Point)                 SURGERY RELATED RISK FACTORS  [ ] Acute myocardial infarction                              (1 Point)               [ ]  Section                                             (1 Point)  [ ] Congestive heart failure (in the previous month)  (1 Point)      [ ] Minor surgery                                                  (1 Point)   [ ] Inflammatory bowel disease                             (1 Point)               [ ] Arthroscopic surgery                                        (2 Points)  [ ] Central venous access                                      (2 Points)                [ x] General surgery lasting more than 45 minutes (2 points)  [ ] Malignancy- Present or previous                   (2 Points)                [ ] Elective arthroplasty                                         (5 points)    [ ] Stroke (in the previous month)                          (5 Points)                                                                                                                                                           HEMATOLOGY RELATED FACTORS                                                 TRAUMA RELATED RISK FACTORS  [ ] Prior episodes of VTE                                     (3 Points)                [ ] Fracture of the hip, pelvis, or leg                       (5 Points)  [ ] Positive family history for VTE                         (3 Points)             [ ] Acute spinal cord injury (in the previous month)  (5 Points)  [ ] Prothrombin 16126 A                                     (3 Points)               [ ] Paralysis  (less than 1 month)                             (5 Points)  [ ] Factor V Leiden                                             (3 Points)                  [ ] Multiple Trauma within 1 month                        (5 Points)  [ ] Lupus anticoagulants                                     (3 Points)                                                           [ ] Anticardiolipin antibodies                               (3 Points)                                                       [ ] High homocysteine in the blood                      (3 Points)                                             [ ] Other congenital or acquired thrombophilia      (3 Points)                                                [ ] Heparin induced thrombocytopenia                  (3 Points)                                     Total Score [      2    ] 32 yo F   x2 LMP 10/2021 PMH of Congential Heart Disease, Bicuspid Aortic Valve, Murmur, and Severe Aortic Stenosis. At the age of 10 patient underwent an open aortic valvuloplasty under cardiopulmonary bypass on 2/15/2001. Patient is now experiencing intermittent episodes of near syncope and palpitations. She denies SOB but c/o occasional exertional dyspnea with some chest discomfort. She is a  and walks routinely throughout the day. She quit smoking 1-1/2 months ago. Preop assessment prior to reoperation sternotomy, aortic valve replacement, ascending hemiarch replacement w/Dr Munguia scheduled for 2021    Pt was educated on preop preparation with written and verbal instructions. Pt was educated on NSAIDs, multivitamins and herbals that increase the risk of bleeding and need to be stopped 7 days before procedure. Pt was educated on covid testing and covid prevention, i.e. social distancing, handwashing, mask wearing. Pt verbalized understanding of the above.     OPIOID RISK TOOL    MARGARITA EACH BOX THAT APPLIES AND ADD TOTALS AT THE END    FAMILY HISTORY OF SUBSTANCE ABUSE                 FEMALE         MALE                                                Alcohol                             [  ]1 pt          [  ]3pts                                               Illegal Durgs                     [  ]2 pts        [  ]3pts                                               Rx Drugs                           [  ]4 pts        [  ]4 pts    PERSONAL HISTORY OF SUBSTANCE ABUSE                                                                                          Alcohol                             [  ]3 pts       [  ]3 pts                                               Illegal Drugs                     [  ]4 pts        [  ]4 pts                                               Rx Drugs                           [  ]5 pts        [  ]5 pts    AGE BETWEEN 16-45 YEARS                                      [ x ]1 pt         [  ]1 pt    HISTORY OF PREADOLESCENT   SEXUAL ABUSE                                                             [  ]3 pts        [  ]0pts    PSYCHOLOGICAL DISEASE                     ADD, OCD, Bipolar, Schizophrenia        [  ]2 pts         [  ]2 pts                      Depression                                               [  ]1 pt           [  ]1 pt           SCORING TOTAL   (add numbers and type here)              ( 1 )                                     A score of 3 or lower indicated LOW risk for future opioid abuse  A score of 4 to 7 indicated moderate risk for future opioid abuse  A score of 8 or higher indicates a high risk for opioid abuse    CAPRINI VTE 2.0 SCORE [CLOT updated 2019]    AGE RELATED RISK FACTORS                                                       MOBILITY RELATED FACTORS  [ ] Age 41-60 years                                            (1 Point)                    [ ] Bed rest                                                        (1 Point)  [ ] Age: 61-74 years                                           (2 Points)                  [ ] Plaster cast                                                   (2 Points)  [ ] Age= 75 years                                              (3 Points)                    [ ] Bed bound for more than 72 hours                 (2 Points)    DISEASE RELATED RISK FACTORS                                               GENDER SPECIFIC FACTORS  [ ] Edema in the lower extremities                       (1 Point)              [ ] Pregnancy                                                     (1 Point)  [ ] Varicose veins                                               (1 Point)                     [ ] Post-partum < 6 weeks                                   (1 Point)             [ ] BMI > 25 Kg/m2                                            (1 Point)                     [ ] Hormonal therapy  or oral contraception          (1 Point)                 [ ] Sepsis (in the previous month)                        (1 Point)               [ ] History of pregnancy complications                 (1 point)  [ ] Pneumonia or serious lung disease                                               [ ] Unexplained or recurrent                     (1 Point)           (in the previous month)                               (1 Point)  [ ] Abnormal pulmonary function test                     (1 Point)                 SURGERY RELATED RISK FACTORS  [ ] Acute myocardial infarction                              (1 Point)               [ ]  Section                                             (1 Point)  [ ] Congestive heart failure (in the previous month)  (1 Point)      [ ] Minor surgery                                                  (1 Point)   [ ] Inflammatory bowel disease                             (1 Point)               [ ] Arthroscopic surgery                                        (2 Points)  [ ] Central venous access                                      (2 Points)                [ x] General surgery lasting more than 45 minutes (2 points)  [ ] Malignancy- Present or previous                   (2 Points)                [ ] Elective arthroplasty                                         (5 points)    [ ] Stroke (in the previous month)                          (5 Points)                                                                                                                                                           HEMATOLOGY RELATED FACTORS                                                 TRAUMA RELATED RISK FACTORS  [ ] Prior episodes of VTE                                     (3 Points)                [ ] Fracture of the hip, pelvis, or leg                       (5 Points)  [ ] Positive family history for VTE                         (3 Points)             [ ] Acute spinal cord injury (in the previous month)  (5 Points)  [ ] Prothrombin 51876 A                                     (3 Points)               [ ] Paralysis  (less than 1 month)                             (5 Points)  [ ] Factor V Leiden                                             (3 Points)                  [ ] Multiple Trauma within 1 month                        (5 Points)  [ ] Lupus anticoagulants                                     (3 Points)                                                           [ ] Anticardiolipin antibodies                               (3 Points)                                                       [ ] High homocysteine in the blood                      (3 Points)                                             [ ] Other congenital or acquired thrombophilia      (3 Points)                                                [ ] Heparin induced thrombocytopenia                  (3 Points)                                     Total Score [      2    ]

## 2021-11-18 NOTE — H&P PST ADULT - NSICDXFAMILYHX_GEN_ALL_CORE_FT
FAMILY HISTORY:  Mother  Still living? Unknown  FH: aortic stenosis, Age at diagnosis: Age Unknown     FAMILY HISTORY:  Mother  Still living? Unknown  FH: aortic stenosis, Age at diagnosis: Age Unknown  FH: diabetes mellitus, Age at diagnosis: Age Unknown     FAMILY HISTORY:  Mother  Still living? Unknown  FH: aortic stenosis, Age at diagnosis: Age Unknown  FH: diabetes mellitus, Age at diagnosis: Age Unknown    Grandparent  Still living? Unknown  FH: aortic stenosis, Age at diagnosis: Age Unknown

## 2021-11-18 NOTE — H&P PST ADULT - NSICDXPASTSURGICALHX_GEN_ALL_CORE_FT
PAST SURGICAL HISTORY:  H/O  section 2017, 2019    History of open heart surgery 2001     PAST SURGICAL HISTORY:  H/O  section 2017, 2019    H/O tubal ligation 2019    History of open heart surgery 2001

## 2021-11-18 NOTE — H&P PST ADULT - PROBLEM SELECTOR PLAN 1
preop assessment, reoperation sternotomy, aortic valve replacement, ascending hemiarch replacement w/Dr Munguia scheduled for 11/30/2021

## 2021-11-18 NOTE — H&P PST ADULT - NSICDXPASTMEDICALHX_GEN_ALL_CORE_FT
PAST MEDICAL HISTORY:  Congenital bicuspid aortic valve     Congenital stenosis of aortic valve     Thoracic aortic aneurysm, without rupture

## 2021-11-18 NOTE — H&P PST ADULT - HISTORY OF PRESENT ILLNESS
32 yo F  LMP PMH of Congential Heart Disease, Bicuspid Aortic Valve, Murmur, near Syncope, and Severe Aortic Stenosis. At the age of 10 patient underwent an open aortic valvuloplasty under cardiopulmonary bypass on 2/15/2001. Patient is now experiencing intermittent episodes of near syncope and palpitations. She denies exertional dyspnea. She is a  and walks routinely throughout the day. She is very active. She quit smoking 1-1/2 months ago. Preop assessment prior to reoperation sternotomy, aortic valve replacement, ascending hemiarch replacement w/Dr Munguia scheduled for 2021    Cardiac Summary:  Echocardiogram 10/18/2021: bicuspid aortic valve bicuspid with severe stenosis. Aortic valve area 0.7 cm². Peak and mean gradients are 113 and 55 mmHg respectively. There is mild to moderate aortic valve regurgitation. The mitral valve is normal with minimal regurgitation. Biventricular function is preserved. The aortic root measures 3.5 cm and the ascending aorta measures 4.2. The aortic arch is measured at 3.6 cm.    Cardiac catheterization 10/28/2021: The coronary anatomy is right dominant. The right coronary artery is normal but small, the left-sided circulation shows no disease. The valve was not crossed for the procedure and there were no gradients.    CT angiogram 11/10/2021: The ascending aorta measures 4 cm the level of the main pulmonary artery. The aorta narrows down to 3.2 cm at the sinotubular junction. The aortic sinuses measure 3.0 x 2.6 cm and therefore not dilated. The descending thoracic aorta measures 2.1 cm in maximal diameter.    Since my initial visit with the patient a little little over a week ago, my office was able to obtain her original operative report from Standard cannulation was utilized, and total cross-clamp time was 22 minutes. The dictation shows a totally uneventful operation.    Also since last seeing the patient, she reports having some dental sensitivity. However she has not seen a dentist as of yet. She is also now contemplating mechanical valve versus bioprosthetic. She also further reported to me that previously when she said she was "done with having children," she failed to report that she is also status post tubal ligation.    The patient's past medical history, past surgical history, family history, social history, allergies, medications, and multisystem review of systems were individually reviewed with the patient. There are no pertinent additions or subtractions. The patient was personally seen and examined.      Active Problems  Aortic stenosis with bicuspid valve (746.3,746.4) (Q23.0,Q23.1)  Cardiac murmur (785.2) (R01.1)  Near syncope (780.2) (R55)  Preop testing (V72.84) (Z01.818)    Family History  Family history of aortic stenosis (V17.49) (Z82.49) : Mother, Maternal Grandfather         30 yo F   x2 LMP 10/2021 PMH of Congential Heart Disease, Bicuspid Aortic Valve, Murmur, near Syncope, and Severe Aortic Stenosis. At the age of 10 patient underwent an open aortic valvuloplasty under cardiopulmonary bypass on 2/15/2001. Patient is now experiencing intermittent episodes of near syncope and palpitations. She denies exertional dyspnea. She is a  and walks routinely throughout the day. She quit smoking 1-1/2 months ago. Preop assessment prior to reoperation sternotomy, aortic valve replacement, ascending hemiarch replacement w/Dr Munguia scheduled for 2021    Cardiac Summary:  Echocardiogram 10/18/2021: bicuspid aortic valve bicuspid with severe stenosis. Aortic valve area 0.7 cm². Peak and mean gradients are 113 and 55 mmHg respectively. There is mild to moderate aortic valve regurgitation. The mitral valve is normal with minimal regurgitation. Biventricular function is preserved. The aortic root measures 3.5 cm and the ascending aorta measures 4.2. The aortic arch is measured at 3.6 cm.    Cardiac catheterization 10/28/2021: The coronary anatomy is right dominant. The right coronary artery is normal but small, the left-sided circulation shows no disease. The valve was not crossed for the procedure and there were no gradients.    CT angiogram 11/10/2021: The ascending aorta measures 4 cm the level of the main pulmonary artery. The aorta narrows down to 3.2 cm at the sinotubular junction. The aortic sinuses measure 3.0 x 2.6 cm and therefore not dilated. The descending thoracic aorta measures 2.1 cm in maximal diameter.    Since my initial visit with the patient a little little over a week ago, my office was able to obtain her original operative report from Standard cannulation was utilized, and total cross-clamp time was 22 minutes. The dictation shows a totally uneventful operation.    Also since last seeing the patient, she reports having some dental sensitivity. However she has not seen a dentist as of yet. She is also now contemplating mechanical valve versus bioprosthetic. She also further reported to me that previously when she said she was "done with having children," she failed to report that she is also status post tubal ligation.    The patient's past medical history, past surgical history, family history, social history, allergies, medications, and multisystem review of systems were individually reviewed with the patient. There are no pertinent additions or subtractions. The patient was personally seen and examined.      Active Problems  Aortic stenosis with bicuspid valve (746.3,746.4) (Q23.0,Q23.1)  Cardiac murmur (785.2) (R01.1)  Near syncope (780.2) (R55)  Preop testing (V72.84) (Z01.818)    Family History  Family history of aortic stenosis (V17.49) (Z82.49) : Mother, Maternal Grandfather         32 yo F   x2 LMP 10/2021 PMH of Congential Heart Disease, Bicuspid Aortic Valve, Murmur, and Severe Aortic Stenosis. At the age of 10 patient underwent an open aortic valvuloplasty under cardiopulmonary bypass on 2/15/2001. Patient is now experiencing intermittent episodes of near syncope and palpitations. She denies SOB but c/o occasional exertional dyspnea with some chest discomfort. She is a  and walks routinely throughout the day. She quit smoking 1-1/2 months ago. Preop assessment prior to reoperation sternotomy, aortic valve replacement, ascending hemiarch replacement w/Dr Munguia scheduled for 2021    Cardiac Summary:  Echocardiogram 10/18/2021: bicuspid aortic valve bicuspid with severe stenosis. Aortic valve area 0.7 cm². Peak and mean gradients are 113 and 55 mmHg respectively. There is mild to moderate aortic valve regurgitation. The mitral valve is normal with minimal regurgitation. Biventricular function is preserved. The aortic root measures 3.5 cm and the ascending aorta measures 4.2. The aortic arch is measured at 3.6 cm.    Cardiac catheterization 10/28/2021: The coronary anatomy is right dominant. The right coronary artery is normal but small, the left-sided circulation shows no disease. The valve was not crossed for the procedure and there were no gradients.    CT angiogram 11/10/2021: The ascending aorta measures 4 cm the level of the main pulmonary artery. The aorta narrows down to 3.2 cm at the sinotubular junction. The aortic sinuses measure 3.0 x 2.6 cm and therefore not dilated. The descending thoracic aorta measures 2.1 cm in maximal diameter.

## 2021-11-19 LAB
CULTURE RESULTS: SIGNIFICANT CHANGE UP
SPECIMEN SOURCE: SIGNIFICANT CHANGE UP

## 2021-11-29 ENCOUNTER — TRANSCRIPTION ENCOUNTER (OUTPATIENT)
Age: 31
End: 2021-11-29

## 2021-11-30 ENCOUNTER — APPOINTMENT (OUTPATIENT)
Dept: CARDIOTHORACIC SURGERY | Facility: HOSPITAL | Age: 31
End: 2021-11-30

## 2021-11-30 ENCOUNTER — INPATIENT (INPATIENT)
Facility: HOSPITAL | Age: 31
LOS: 8 days | Discharge: HOME | DRG: 220 | End: 2021-12-09
Attending: THORACIC SURGERY (CARDIOTHORACIC VASCULAR SURGERY) | Admitting: THORACIC SURGERY (CARDIOTHORACIC VASCULAR SURGERY)
Payer: COMMERCIAL

## 2021-11-30 ENCOUNTER — RESULT REVIEW (OUTPATIENT)
Age: 31
End: 2021-11-30

## 2021-11-30 VITALS
OXYGEN SATURATION: 99 % | HEART RATE: 60 BPM | SYSTOLIC BLOOD PRESSURE: 111 MMHG | HEIGHT: 62 IN | RESPIRATION RATE: 16 BRPM | DIASTOLIC BLOOD PRESSURE: 63 MMHG | TEMPERATURE: 98 F | WEIGHT: 150.58 LBS

## 2021-11-30 DIAGNOSIS — Z98.51 TUBAL LIGATION STATUS: Chronic | ICD-10-CM

## 2021-11-30 DIAGNOSIS — Z98.891 HISTORY OF UTERINE SCAR FROM PREVIOUS SURGERY: Chronic | ICD-10-CM

## 2021-11-30 DIAGNOSIS — Q23.0 CONGENITAL STENOSIS OF AORTIC VALVE: ICD-10-CM

## 2021-11-30 DIAGNOSIS — Z98.890 OTHER SPECIFIED POSTPROCEDURAL STATES: Chronic | ICD-10-CM

## 2021-11-30 LAB
ABO RH CONFIRMATION: SIGNIFICANT CHANGE UP
ALBUMIN SERPL ELPH-MCNC: 3.7 G/DL — SIGNIFICANT CHANGE UP (ref 3.3–5.2)
ALP SERPL-CCNC: 31 U/L — LOW (ref 40–120)
ALT FLD-CCNC: 10 U/L — SIGNIFICANT CHANGE UP
ANION GAP SERPL CALC-SCNC: 13 MMOL/L — SIGNIFICANT CHANGE UP (ref 5–17)
APTT BLD: 29.5 SEC — SIGNIFICANT CHANGE UP (ref 27.5–35.5)
AST SERPL-CCNC: 28 U/L — SIGNIFICANT CHANGE UP
BASE EXCESS BLDV CALC-SCNC: 1 MMOL/L — SIGNIFICANT CHANGE UP (ref -2–3)
BASOPHILS # BLD AUTO: 0.03 K/UL — SIGNIFICANT CHANGE UP (ref 0–0.2)
BASOPHILS NFR BLD AUTO: 0.2 % — SIGNIFICANT CHANGE UP (ref 0–2)
BILIRUB SERPL-MCNC: 0.9 MG/DL — SIGNIFICANT CHANGE UP (ref 0.4–2)
BUN SERPL-MCNC: 8.2 MG/DL — SIGNIFICANT CHANGE UP (ref 8–20)
CALCIUM SERPL-MCNC: 8.1 MG/DL — LOW (ref 8.6–10.2)
CHLORIDE SERPL-SCNC: 105 MMOL/L — SIGNIFICANT CHANGE UP (ref 98–107)
CO2 SERPL-SCNC: 21 MMOL/L — LOW (ref 22–29)
CREAT SERPL-MCNC: 0.39 MG/DL — LOW (ref 0.5–1.3)
EOSINOPHIL # BLD AUTO: 0 K/UL — SIGNIFICANT CHANGE UP (ref 0–0.5)
EOSINOPHIL NFR BLD AUTO: 0 % — SIGNIFICANT CHANGE UP (ref 0–6)
GAS PNL BLDA: SIGNIFICANT CHANGE UP
GAS PNL BLDV: SIGNIFICANT CHANGE UP
GLUCOSE BLDC GLUCOMTR-MCNC: 155 MG/DL — HIGH (ref 70–99)
GLUCOSE BLDC GLUCOMTR-MCNC: 157 MG/DL — HIGH (ref 70–99)
GLUCOSE BLDC GLUCOMTR-MCNC: 160 MG/DL — HIGH (ref 70–99)
GLUCOSE BLDC GLUCOMTR-MCNC: 185 MG/DL — HIGH (ref 70–99)
GLUCOSE SERPL-MCNC: 206 MG/DL — HIGH (ref 70–99)
HCO3 BLDV-SCNC: 26 MMOL/L — SIGNIFICANT CHANGE UP (ref 22–29)
HCT VFR BLD CALC: 27.6 % — LOW (ref 34.5–45)
HGB BLD-MCNC: 9.5 G/DL — LOW (ref 11.5–15.5)
HIV 1 & 2 AB SERPL IA.RAPID: SIGNIFICANT CHANGE UP
HOROWITZ INDEX BLDV+IHG-RTO: 50 — SIGNIFICANT CHANGE UP
IMM GRANULOCYTES NFR BLD AUTO: 0.6 % — SIGNIFICANT CHANGE UP (ref 0–1.5)
INR BLD: 1.41 RATIO — HIGH (ref 0.88–1.16)
LYMPHOCYTES # BLD AUTO: 0.59 K/UL — LOW (ref 1–3.3)
LYMPHOCYTES # BLD AUTO: 4.5 % — LOW (ref 13–44)
MAGNESIUM SERPL-MCNC: 2.5 MG/DL — SIGNIFICANT CHANGE UP (ref 1.6–2.6)
MCHC RBC-ENTMCNC: 30.4 PG — SIGNIFICANT CHANGE UP (ref 27–34)
MCHC RBC-ENTMCNC: 34.4 GM/DL — SIGNIFICANT CHANGE UP (ref 32–36)
MCV RBC AUTO: 88.2 FL — SIGNIFICANT CHANGE UP (ref 80–100)
MONOCYTES # BLD AUTO: 0.68 K/UL — SIGNIFICANT CHANGE UP (ref 0–0.9)
MONOCYTES NFR BLD AUTO: 5.1 % — SIGNIFICANT CHANGE UP (ref 2–14)
NEUTROPHILS # BLD AUTO: 11.84 K/UL — HIGH (ref 1.8–7.4)
NEUTROPHILS NFR BLD AUTO: 89.6 % — HIGH (ref 43–77)
PCO2 BLDV: 43 MMHG — HIGH (ref 39–42)
PH BLDV: 7.39 — SIGNIFICANT CHANGE UP (ref 7.32–7.43)
PLATELET # BLD AUTO: 140 K/UL — LOW (ref 150–400)
PO2 BLDV: 43 MMHG — SIGNIFICANT CHANGE UP (ref 25–45)
POTASSIUM SERPL-MCNC: 4.4 MMOL/L — SIGNIFICANT CHANGE UP (ref 3.5–5.3)
POTASSIUM SERPL-SCNC: 4.4 MMOL/L — SIGNIFICANT CHANGE UP (ref 3.5–5.3)
PROT SERPL-MCNC: 5 G/DL — LOW (ref 6.6–8.7)
PROTHROM AB SERPL-ACNC: 16.1 SEC — HIGH (ref 10.6–13.6)
RBC # BLD: 3.13 M/UL — LOW (ref 3.8–5.2)
RBC # FLD: 11.8 % — SIGNIFICANT CHANGE UP (ref 10.3–14.5)
SAO2 % BLDV: 75.6 % — SIGNIFICANT CHANGE UP
SODIUM SERPL-SCNC: 139 MMOL/L — SIGNIFICANT CHANGE UP (ref 135–145)
WBC # BLD: 13.22 K/UL — HIGH (ref 3.8–10.5)
WBC # FLD AUTO: 13.22 K/UL — HIGH (ref 3.8–10.5)

## 2021-11-30 PROCEDURE — 33863 ASCENDING AORTIC GRAFT: CPT | Mod: AS

## 2021-11-30 PROCEDURE — 33863 ASCENDING AORTIC GRAFT: CPT

## 2021-11-30 PROCEDURE — 33530 CORONARY ARTERY BYPASS/REOP: CPT

## 2021-11-30 PROCEDURE — 93010 ELECTROCARDIOGRAM REPORT: CPT

## 2021-11-30 PROCEDURE — 71045 X-RAY EXAM CHEST 1 VIEW: CPT | Mod: 26

## 2021-11-30 PROCEDURE — 33866 AORTIC HEMIARCH GRAFT: CPT

## 2021-11-30 PROCEDURE — 33530 CORONARY ARTERY BYPASS/REOP: CPT | Mod: AS

## 2021-11-30 PROCEDURE — 88305 TISSUE EXAM BY PATHOLOGIST: CPT | Mod: 26

## 2021-11-30 PROCEDURE — 33866 AORTIC HEMIARCH GRAFT: CPT | Mod: AS

## 2021-11-30 RX ORDER — POTASSIUM CHLORIDE 20 MEQ
10 PACKET (EA) ORAL
Refills: 0 | Status: DISCONTINUED | OUTPATIENT
Start: 2021-11-30 | End: 2021-12-01

## 2021-11-30 RX ORDER — LIDOCAINE 4 G/100G
1 CREAM TOPICAL DAILY
Refills: 0 | Status: DISCONTINUED | OUTPATIENT
Start: 2021-11-30 | End: 2021-12-09

## 2021-11-30 RX ORDER — VANCOMYCIN HCL 1 G
1000 VIAL (EA) INTRAVENOUS EVERY 12 HOURS
Refills: 0 | Status: COMPLETED | OUTPATIENT
Start: 2021-11-30 | End: 2021-12-02

## 2021-11-30 RX ORDER — CHLORHEXIDINE GLUCONATE 213 G/1000ML
1 SOLUTION TOPICAL EVERY 12 HOURS
Refills: 0 | Status: DISCONTINUED | OUTPATIENT
Start: 2021-11-30 | End: 2021-12-01

## 2021-11-30 RX ORDER — DEXTROSE 50 % IN WATER 50 %
50 SYRINGE (ML) INTRAVENOUS
Refills: 0 | Status: DISCONTINUED | OUTPATIENT
Start: 2021-11-30 | End: 2021-12-01

## 2021-11-30 RX ORDER — NICARDIPINE HYDROCHLORIDE 30 MG/1
5 CAPSULE, EXTENDED RELEASE ORAL
Qty: 40 | Refills: 0 | Status: DISCONTINUED | OUTPATIENT
Start: 2021-11-30 | End: 2021-12-01

## 2021-11-30 RX ORDER — NOREPINEPHRINE BITARTRATE/D5W 8 MG/250ML
0.05 PLASTIC BAG, INJECTION (ML) INTRAVENOUS
Qty: 8 | Refills: 0 | Status: DISCONTINUED | OUTPATIENT
Start: 2021-11-30 | End: 2021-11-30

## 2021-11-30 RX ORDER — DEXTROSE 50 % IN WATER 50 %
25 SYRINGE (ML) INTRAVENOUS
Refills: 0 | Status: DISCONTINUED | OUTPATIENT
Start: 2021-11-30 | End: 2021-12-01

## 2021-11-30 RX ORDER — POTASSIUM CHLORIDE 20 MEQ
10 PACKET (EA) ORAL
Refills: 0 | Status: COMPLETED | OUTPATIENT
Start: 2021-11-30 | End: 2021-11-30

## 2021-11-30 RX ORDER — SODIUM CHLORIDE 9 MG/ML
1000 INJECTION INTRAMUSCULAR; INTRAVENOUS; SUBCUTANEOUS
Refills: 0 | Status: DISCONTINUED | OUTPATIENT
Start: 2021-11-30 | End: 2021-12-01

## 2021-11-30 RX ORDER — HYDROMORPHONE HYDROCHLORIDE 2 MG/ML
0.2 INJECTION INTRAMUSCULAR; INTRAVENOUS; SUBCUTANEOUS ONCE
Refills: 0 | Status: DISCONTINUED | OUTPATIENT
Start: 2021-11-30 | End: 2021-11-30

## 2021-11-30 RX ORDER — CHLORHEXIDINE GLUCONATE 213 G/1000ML
5 SOLUTION TOPICAL
Refills: 0 | Status: DISCONTINUED | OUTPATIENT
Start: 2021-11-30 | End: 2021-12-01

## 2021-11-30 RX ORDER — KETOROLAC TROMETHAMINE 30 MG/ML
30 SYRINGE (ML) INJECTION ONCE
Refills: 0 | Status: DISCONTINUED | OUTPATIENT
Start: 2021-11-30 | End: 2021-11-30

## 2021-11-30 RX ORDER — SODIUM CHLORIDE 9 MG/ML
500 INJECTION, SOLUTION INTRAVENOUS ONCE
Refills: 0 | Status: COMPLETED | OUTPATIENT
Start: 2021-11-30 | End: 2021-11-30

## 2021-11-30 RX ORDER — PANTOPRAZOLE SODIUM 20 MG/1
40 TABLET, DELAYED RELEASE ORAL DAILY
Refills: 0 | Status: DISCONTINUED | OUTPATIENT
Start: 2021-12-01 | End: 2021-12-09

## 2021-11-30 RX ORDER — CEFUROXIME AXETIL 250 MG
1500 TABLET ORAL ONCE
Refills: 0 | Status: DISCONTINUED | OUTPATIENT
Start: 2021-11-30 | End: 2021-11-30

## 2021-11-30 RX ORDER — PANTOPRAZOLE SODIUM 20 MG/1
40 TABLET, DELAYED RELEASE ORAL ONCE
Refills: 0 | Status: DISCONTINUED | OUTPATIENT
Start: 2021-11-30 | End: 2021-12-01

## 2021-11-30 RX ORDER — METHOCARBAMOL 500 MG/1
500 TABLET, FILM COATED ORAL EVERY 8 HOURS
Refills: 0 | Status: DISCONTINUED | OUTPATIENT
Start: 2021-11-30 | End: 2021-12-09

## 2021-11-30 RX ORDER — ACETAMINOPHEN 500 MG
1000 TABLET ORAL ONCE
Refills: 0 | Status: COMPLETED | OUTPATIENT
Start: 2021-11-30 | End: 2021-11-30

## 2021-11-30 RX ORDER — DEXMEDETOMIDINE HYDROCHLORIDE IN 0.9% SODIUM CHLORIDE 4 UG/ML
1 INJECTION INTRAVENOUS
Qty: 200 | Refills: 0 | Status: DISCONTINUED | OUTPATIENT
Start: 2021-11-30 | End: 2021-12-01

## 2021-11-30 RX ORDER — GABAPENTIN 400 MG/1
100 CAPSULE ORAL THREE TIMES A DAY
Refills: 0 | Status: DISCONTINUED | OUTPATIENT
Start: 2021-11-30 | End: 2021-12-01

## 2021-11-30 RX ORDER — INSULIN HUMAN 100 [IU]/ML
1 INJECTION, SOLUTION SUBCUTANEOUS
Qty: 50 | Refills: 0 | Status: DISCONTINUED | OUTPATIENT
Start: 2021-11-30 | End: 2021-12-01

## 2021-11-30 RX ORDER — HYDROMORPHONE HYDROCHLORIDE 2 MG/ML
0.5 INJECTION INTRAMUSCULAR; INTRAVENOUS; SUBCUTANEOUS
Refills: 0 | Status: DISCONTINUED | OUTPATIENT
Start: 2021-11-30 | End: 2021-12-01

## 2021-11-30 RX ORDER — FENTANYL CITRATE 50 UG/ML
50 INJECTION INTRAVENOUS
Refills: 0 | Status: DISCONTINUED | OUTPATIENT
Start: 2021-11-30 | End: 2021-12-01

## 2021-11-30 RX ORDER — NICARDIPINE HYDROCHLORIDE 30 MG/1
5 CAPSULE, EXTENDED RELEASE ORAL
Qty: 40 | Refills: 0 | Status: DISCONTINUED | OUTPATIENT
Start: 2021-11-30 | End: 2021-11-30

## 2021-11-30 RX ORDER — CEFUROXIME AXETIL 250 MG
1500 TABLET ORAL EVERY 8 HOURS
Refills: 0 | Status: DISCONTINUED | OUTPATIENT
Start: 2021-11-30 | End: 2021-12-03

## 2021-11-30 RX ADMIN — Medication 250 MILLIGRAM(S): at 21:06

## 2021-11-30 RX ADMIN — Medication 30 MILLIGRAM(S): at 19:45

## 2021-11-30 RX ADMIN — Medication 30 MILLIGRAM(S): at 19:30

## 2021-11-30 RX ADMIN — Medication 400 MILLIGRAM(S): at 17:40

## 2021-11-30 RX ADMIN — SODIUM CHLORIDE 3 MILLILITER(S): 9 INJECTION INTRAMUSCULAR; INTRAVENOUS; SUBCUTANEOUS at 21:58

## 2021-11-30 RX ADMIN — FENTANYL CITRATE 50 MICROGRAM(S): 50 INJECTION INTRAVENOUS at 17:08

## 2021-11-30 RX ADMIN — HYDROMORPHONE HYDROCHLORIDE 0.5 MILLIGRAM(S): 2 INJECTION INTRAMUSCULAR; INTRAVENOUS; SUBCUTANEOUS at 20:15

## 2021-11-30 RX ADMIN — HYDROMORPHONE HYDROCHLORIDE 0.2 MILLIGRAM(S): 2 INJECTION INTRAMUSCULAR; INTRAVENOUS; SUBCUTANEOUS at 22:15

## 2021-11-30 RX ADMIN — FENTANYL CITRATE 50 MICROGRAM(S): 50 INJECTION INTRAVENOUS at 17:23

## 2021-11-30 RX ADMIN — HYDROMORPHONE HYDROCHLORIDE 0.5 MILLIGRAM(S): 2 INJECTION INTRAMUSCULAR; INTRAVENOUS; SUBCUTANEOUS at 20:00

## 2021-11-30 RX ADMIN — SODIUM CHLORIDE 1000 MILLILITER(S): 9 INJECTION, SOLUTION INTRAVENOUS at 19:30

## 2021-11-30 RX ADMIN — CHLORHEXIDINE GLUCONATE 5 MILLILITER(S): 213 SOLUTION TOPICAL at 18:23

## 2021-11-30 RX ADMIN — Medication 100 MILLIEQUIVALENT(S): at 18:24

## 2021-11-30 RX ADMIN — SODIUM CHLORIDE 1000 MILLILITER(S): 9 INJECTION, SOLUTION INTRAVENOUS at 17:09

## 2021-11-30 RX ADMIN — HYDROMORPHONE HYDROCHLORIDE 0.2 MILLIGRAM(S): 2 INJECTION INTRAMUSCULAR; INTRAVENOUS; SUBCUTANEOUS at 22:30

## 2021-11-30 NOTE — BRIEF OPERATIVE NOTE - NSICDXBRIEFPROCEDURE_GEN_ALL_CORE_FT
PROCEDURES:  Replacement, aortic valve, with DOM 30-Nov-2021 16:05:22  Jamil Zuñiga  Replacement, ascending aorta and hemiarch 30-Nov-2021 16:05:44  Jamil Zuñiga

## 2021-11-30 NOTE — BRIEF OPERATIVE NOTE - NSICDXBRIEFPREOP_GEN_ALL_CORE_FT
PRE-OP DIAGNOSIS:  Ascending aortic aneurysm 30-Nov-2021 15:58:23  Jamil Zuñiga  Bicuspid aortic valve 30-Nov-2021 16:04:02  Jamil Zuñiga

## 2021-11-30 NOTE — BRIEF OPERATIVE NOTE - OPERATION/FINDINGS
Bicuspid aortic valve, dilated ascending aorta.  Patient underwent AVR with # 19 On-X mechanical valve and replacemment of ascending aorta and hemiarch with # 24 Gelweave graft with antegrade cerebral perfusion.

## 2021-11-30 NOTE — BRIEF OPERATIVE NOTE - NSICDXBRIEFPOSTOP_GEN_ALL_CORE_FT
POST-OP DIAGNOSIS:  Ascending aortic aneurysm 30-Nov-2021 16:04:39  Jamil Zuñiga  Bicuspid aortic valve 30-Nov-2021 16:04:50  Jamil Zuñiga

## 2021-12-01 LAB
ANION GAP SERPL CALC-SCNC: 10 MMOL/L — SIGNIFICANT CHANGE UP (ref 5–17)
BUN SERPL-MCNC: 8.4 MG/DL — SIGNIFICANT CHANGE UP (ref 8–20)
CALCIUM SERPL-MCNC: 7.6 MG/DL — LOW (ref 8.6–10.2)
CHLORIDE SERPL-SCNC: 109 MMOL/L — HIGH (ref 98–107)
CO2 SERPL-SCNC: 21 MMOL/L — LOW (ref 22–29)
CREAT SERPL-MCNC: 0.39 MG/DL — LOW (ref 0.5–1.3)
GLUCOSE BLDC GLUCOMTR-MCNC: 104 MG/DL — HIGH (ref 70–99)
GLUCOSE BLDC GLUCOMTR-MCNC: 111 MG/DL — HIGH (ref 70–99)
GLUCOSE BLDC GLUCOMTR-MCNC: 124 MG/DL — HIGH (ref 70–99)
GLUCOSE BLDC GLUCOMTR-MCNC: 132 MG/DL — HIGH (ref 70–99)
GLUCOSE BLDC GLUCOMTR-MCNC: 134 MG/DL — HIGH (ref 70–99)
GLUCOSE BLDC GLUCOMTR-MCNC: 134 MG/DL — HIGH (ref 70–99)
GLUCOSE BLDC GLUCOMTR-MCNC: 142 MG/DL — HIGH (ref 70–99)
GLUCOSE BLDC GLUCOMTR-MCNC: 149 MG/DL — HIGH (ref 70–99)
GLUCOSE BLDC GLUCOMTR-MCNC: 158 MG/DL — HIGH (ref 70–99)
GLUCOSE SERPL-MCNC: 144 MG/DL — HIGH (ref 70–99)
HAV IGM SER-ACNC: SIGNIFICANT CHANGE UP
HBV CORE IGM SER-ACNC: SIGNIFICANT CHANGE UP
HBV SURFACE AG SER-ACNC: SIGNIFICANT CHANGE UP
HCT VFR BLD CALC: 26.7 % — LOW (ref 34.5–45)
HCV AB S/CO SERPL IA: 0.11 S/CO — SIGNIFICANT CHANGE UP (ref 0–0.99)
HCV AB SERPL-IMP: SIGNIFICANT CHANGE UP
HGB BLD-MCNC: 8.7 G/DL — LOW (ref 11.5–15.5)
MAGNESIUM SERPL-MCNC: 1.9 MG/DL — SIGNIFICANT CHANGE UP (ref 1.6–2.6)
MCHC RBC-ENTMCNC: 29.6 PG — SIGNIFICANT CHANGE UP (ref 27–34)
MCHC RBC-ENTMCNC: 32.6 GM/DL — SIGNIFICANT CHANGE UP (ref 32–36)
MCV RBC AUTO: 90.8 FL — SIGNIFICANT CHANGE UP (ref 80–100)
PLATELET # BLD AUTO: 131 K/UL — LOW (ref 150–400)
POTASSIUM SERPL-MCNC: 4.3 MMOL/L — SIGNIFICANT CHANGE UP (ref 3.5–5.3)
POTASSIUM SERPL-SCNC: 4.3 MMOL/L — SIGNIFICANT CHANGE UP (ref 3.5–5.3)
RBC # BLD: 2.94 M/UL — LOW (ref 3.8–5.2)
RBC # FLD: 12.1 % — SIGNIFICANT CHANGE UP (ref 10.3–14.5)
SODIUM SERPL-SCNC: 140 MMOL/L — SIGNIFICANT CHANGE UP (ref 135–145)
WBC # BLD: 16.22 K/UL — HIGH (ref 3.8–10.5)
WBC # FLD AUTO: 16.22 K/UL — HIGH (ref 3.8–10.5)

## 2021-12-01 PROCEDURE — 93010 ELECTROCARDIOGRAM REPORT: CPT

## 2021-12-01 PROCEDURE — 71045 X-RAY EXAM CHEST 1 VIEW: CPT | Mod: 26

## 2021-12-01 RX ORDER — GABAPENTIN 400 MG/1
200 CAPSULE ORAL ONCE
Refills: 0 | Status: COMPLETED | OUTPATIENT
Start: 2021-12-01 | End: 2021-12-01

## 2021-12-01 RX ORDER — HYDROMORPHONE HYDROCHLORIDE 2 MG/ML
0.25 INJECTION INTRAMUSCULAR; INTRAVENOUS; SUBCUTANEOUS
Refills: 0 | Status: DISCONTINUED | OUTPATIENT
Start: 2021-12-01 | End: 2021-12-02

## 2021-12-01 RX ORDER — WARFARIN SODIUM 2.5 MG/1
3 TABLET ORAL ONCE
Refills: 0 | Status: COMPLETED | OUTPATIENT
Start: 2021-12-01 | End: 2021-12-01

## 2021-12-01 RX ORDER — KETOROLAC TROMETHAMINE 30 MG/ML
30 SYRINGE (ML) INJECTION EVERY 6 HOURS
Refills: 0 | Status: DISCONTINUED | OUTPATIENT
Start: 2021-12-01 | End: 2021-12-02

## 2021-12-01 RX ORDER — MAGNESIUM SULFATE 500 MG/ML
2 VIAL (ML) INJECTION ONCE
Refills: 0 | Status: COMPLETED | OUTPATIENT
Start: 2021-12-01 | End: 2021-12-01

## 2021-12-01 RX ORDER — GABAPENTIN 400 MG/1
300 CAPSULE ORAL EVERY 8 HOURS
Refills: 0 | Status: DISCONTINUED | OUTPATIENT
Start: 2021-12-01 | End: 2021-12-09

## 2021-12-01 RX ORDER — FUROSEMIDE 40 MG
40 TABLET ORAL ONCE
Refills: 0 | Status: COMPLETED | OUTPATIENT
Start: 2021-12-01 | End: 2021-12-01

## 2021-12-01 RX ORDER — INSULIN LISPRO 100/ML
VIAL (ML) SUBCUTANEOUS
Refills: 0 | Status: DISCONTINUED | OUTPATIENT
Start: 2021-12-01 | End: 2021-12-02

## 2021-12-01 RX ORDER — ALBUMIN HUMAN 25 %
250 VIAL (ML) INTRAVENOUS ONCE
Refills: 0 | Status: COMPLETED | OUTPATIENT
Start: 2021-12-01 | End: 2021-12-01

## 2021-12-01 RX ORDER — SODIUM CHLORIDE 9 MG/ML
500 INJECTION, SOLUTION INTRAVENOUS ONCE
Refills: 0 | Status: COMPLETED | OUTPATIENT
Start: 2021-12-01 | End: 2021-12-01

## 2021-12-01 RX ORDER — SENNA PLUS 8.6 MG/1
2 TABLET ORAL AT BEDTIME
Refills: 0 | Status: DISCONTINUED | OUTPATIENT
Start: 2021-12-01 | End: 2021-12-09

## 2021-12-01 RX ORDER — OXYCODONE HYDROCHLORIDE 5 MG/1
5 TABLET ORAL EVERY 4 HOURS
Refills: 0 | Status: DISCONTINUED | OUTPATIENT
Start: 2021-12-01 | End: 2021-12-08

## 2021-12-01 RX ORDER — ASPIRIN/CALCIUM CARB/MAGNESIUM 324 MG
81 TABLET ORAL DAILY
Refills: 0 | Status: DISCONTINUED | OUTPATIENT
Start: 2021-12-01 | End: 2021-12-09

## 2021-12-01 RX ORDER — OXYCODONE HYDROCHLORIDE 5 MG/1
10 TABLET ORAL EVERY 4 HOURS
Refills: 0 | Status: DISCONTINUED | OUTPATIENT
Start: 2021-12-01 | End: 2021-12-08

## 2021-12-01 RX ADMIN — GABAPENTIN 300 MILLIGRAM(S): 400 CAPSULE ORAL at 13:30

## 2021-12-01 RX ADMIN — CHLORHEXIDINE GLUCONATE 1 APPLICATION(S): 213 SOLUTION TOPICAL at 05:21

## 2021-12-01 RX ADMIN — Medication 30 MILLIGRAM(S): at 17:55

## 2021-12-01 RX ADMIN — Medication 30 MILLIGRAM(S): at 08:17

## 2021-12-01 RX ADMIN — Medication 125 MILLILITER(S): at 12:09

## 2021-12-01 RX ADMIN — SODIUM CHLORIDE 3 MILLILITER(S): 9 INJECTION INTRAMUSCULAR; INTRAVENOUS; SUBCUTANEOUS at 13:41

## 2021-12-01 RX ADMIN — LIDOCAINE 1 PATCH: 4 CREAM TOPICAL at 06:35

## 2021-12-01 RX ADMIN — LIDOCAINE 1 PATCH: 4 CREAM TOPICAL at 13:31

## 2021-12-01 RX ADMIN — Medication 250 MILLIGRAM(S): at 08:06

## 2021-12-01 RX ADMIN — WARFARIN SODIUM 3 MILLIGRAM(S): 2.5 TABLET ORAL at 22:53

## 2021-12-01 RX ADMIN — Medication 40 MILLIGRAM(S): at 22:50

## 2021-12-01 RX ADMIN — Medication 30 MILLIGRAM(S): at 12:10

## 2021-12-01 RX ADMIN — OXYCODONE HYDROCHLORIDE 5 MILLIGRAM(S): 5 TABLET ORAL at 22:50

## 2021-12-01 RX ADMIN — HYDROMORPHONE HYDROCHLORIDE 0.25 MILLIGRAM(S): 2 INJECTION INTRAMUSCULAR; INTRAVENOUS; SUBCUTANEOUS at 08:17

## 2021-12-01 RX ADMIN — SODIUM CHLORIDE 1000 MILLILITER(S): 9 INJECTION, SOLUTION INTRAVENOUS at 06:02

## 2021-12-01 RX ADMIN — HYDROMORPHONE HYDROCHLORIDE 0.25 MILLIGRAM(S): 2 INJECTION INTRAMUSCULAR; INTRAVENOUS; SUBCUTANEOUS at 07:53

## 2021-12-01 RX ADMIN — Medication 100 MILLIGRAM(S): at 01:30

## 2021-12-01 RX ADMIN — METHOCARBAMOL 500 MILLIGRAM(S): 500 TABLET, FILM COATED ORAL at 02:46

## 2021-12-01 RX ADMIN — SENNA PLUS 2 TABLET(S): 8.6 TABLET ORAL at 21:29

## 2021-12-01 RX ADMIN — Medication 100 MILLIGRAM(S): at 17:31

## 2021-12-01 RX ADMIN — LIDOCAINE 1 PATCH: 4 CREAM TOPICAL at 13:41

## 2021-12-01 RX ADMIN — GABAPENTIN 200 MILLIGRAM(S): 400 CAPSULE ORAL at 08:53

## 2021-12-01 RX ADMIN — Medication 50 GRAM(S): at 08:53

## 2021-12-01 RX ADMIN — SODIUM CHLORIDE 3 MILLILITER(S): 9 INJECTION INTRAMUSCULAR; INTRAVENOUS; SUBCUTANEOUS at 05:22

## 2021-12-01 RX ADMIN — GABAPENTIN 100 MILLIGRAM(S): 400 CAPSULE ORAL at 00:01

## 2021-12-01 RX ADMIN — Medication 30 MILLIGRAM(S): at 17:31

## 2021-12-01 RX ADMIN — GABAPENTIN 300 MILLIGRAM(S): 400 CAPSULE ORAL at 21:33

## 2021-12-01 RX ADMIN — GABAPENTIN 100 MILLIGRAM(S): 400 CAPSULE ORAL at 05:20

## 2021-12-01 RX ADMIN — Medication 100 MILLIGRAM(S): at 08:16

## 2021-12-01 RX ADMIN — LIDOCAINE 1 PATCH: 4 CREAM TOPICAL at 17:55

## 2021-12-01 RX ADMIN — Medication 30 MILLIGRAM(S): at 07:39

## 2021-12-01 RX ADMIN — SODIUM CHLORIDE 3 MILLILITER(S): 9 INJECTION INTRAMUSCULAR; INTRAVENOUS; SUBCUTANEOUS at 21:57

## 2021-12-01 RX ADMIN — Medication 30 MILLIGRAM(S): at 16:11

## 2021-12-01 RX ADMIN — Medication 250 MILLIGRAM(S): at 21:30

## 2021-12-01 RX ADMIN — OXYCODONE HYDROCHLORIDE 5 MILLIGRAM(S): 5 TABLET ORAL at 23:30

## 2021-12-01 RX ADMIN — LIDOCAINE 1 PATCH: 4 CREAM TOPICAL at 00:01

## 2021-12-01 RX ADMIN — PANTOPRAZOLE SODIUM 40 MILLIGRAM(S): 20 TABLET, DELAYED RELEASE ORAL at 13:30

## 2021-12-01 RX ADMIN — Medication 81 MILLIGRAM(S): at 12:11

## 2021-12-01 NOTE — PHYSICAL THERAPY INITIAL EVALUATION ADULT - ADDITIONAL COMMENTS
Pt. states she will be staying with a friend upon discharge in a private home with 1 step to enter.  Friend will be available to assist.

## 2021-12-01 NOTE — PROGRESS NOTE ADULT - SUBJECTIVE AND OBJECTIVE BOX
32 yo F   x2 LMP 10/2021 PMH of Congential Heart Disease, Bicuspid Aortic Valve, Murmur, and Severe Aortic Stenosis. At the age of 10 patient underwent an open aortic valvuloplasty under cardiopulmonary bypass on 2/15/2001. Patient is now experiencing intermittent episodes of near syncope and palpitations. She denies SOB but c/o occasional exertional dyspnea with some chest discomfort. She is a  and walks routinely throughout the day. She quit smoking 1-1/2 months ago. Preop assessment prior to reoperation sternotomy, aortic valve replacement, ascending hemiarch replacement w/Dr Munguia scheduled for 2021    Subjective:  Patient found lying in bed, anxious and uncomfortable s/p OHS. Patient states "my back really hurts on this bed, I am young but, I have the body of an old person"      ICU Vital Signs Last 24 Hrs  T(C): 36.7 (01 Dec 2021 00:00), Max: 36.7 (01 Dec 2021 00:00)  T(F): 98.1 (01 Dec 2021 00:00), Max: 98.1 (01 Dec 2021 00:00)  HR: 57 (01 Dec 2021 02:00) (55 - 94)  BP: 98/55 (01 Dec 2021 02:00) (95/52 - 111/63)  BP(mean): 72 (01 Dec 2021 02:00) (70 - 79)  ABP: 100/60 (01 Dec 2021 02:00) (87/42 - 150/59)  ABP(mean): 78 (01 Dec 2021 02:00) (5 - 88)  RR: 18 (2021 22:30) (15 - 18)  SpO2: 100% (01 Dec 2021 02:00) (99% - 100%)    I&O's Summary    2021 07:01  -  01 Dec 2021 02:30  --------------------------------------------------------  IN: 1461 mL / OUT: 1135 mL / NET: 326 mL                          9.5    13.22 )-----------( 140      ( 2021 16:23 )             27.6       139  |  105  |  8.2  ----------------------------<  206<H>  4.4   |  21.0<L>  |  0.39<L>    Ca    8.1<L>      2021 16:23  Mg     2.5         TPro  5.0<L>  /  Alb  3.7  /  TBili  0.9  /  DBili  x   /  AST  28  /  ALT  10  /  AlkPhos  31<L>      Constitutional: NAD, well developed, well nourished  Neuro: A+O x 3, BORDEN, FC  CV: regular rate, regular rhythm, +S1S2  Pulm/chest: lung sounds CTA and equal bilaterally, no accessory muscle use noted  Abd: soft, NT, ND,  Ext: BORDEN x 4, no C/C/E  Skin: warm, well perfused, MSI dressing CDI, CT in place with no Airleak

## 2021-12-02 LAB
ANION GAP SERPL CALC-SCNC: 10 MMOL/L — SIGNIFICANT CHANGE UP (ref 5–17)
APTT BLD: 28.2 SEC — SIGNIFICANT CHANGE UP (ref 27.5–35.5)
BUN SERPL-MCNC: 14.7 MG/DL — SIGNIFICANT CHANGE UP (ref 8–20)
CALCIUM SERPL-MCNC: 8.2 MG/DL — LOW (ref 8.6–10.2)
CHLORIDE SERPL-SCNC: 104 MMOL/L — SIGNIFICANT CHANGE UP (ref 98–107)
CO2 SERPL-SCNC: 25 MMOL/L — SIGNIFICANT CHANGE UP (ref 22–29)
CREAT SERPL-MCNC: 0.52 MG/DL — SIGNIFICANT CHANGE UP (ref 0.5–1.3)
GLUCOSE BLDC GLUCOMTR-MCNC: 112 MG/DL — HIGH (ref 70–99)
GLUCOSE BLDC GLUCOMTR-MCNC: 135 MG/DL — HIGH (ref 70–99)
GLUCOSE SERPL-MCNC: 140 MG/DL — HIGH (ref 70–99)
HCT VFR BLD CALC: 25.9 % — LOW (ref 34.5–45)
HGB BLD-MCNC: 8.5 G/DL — LOW (ref 11.5–15.5)
INR BLD: 1.15 RATIO — SIGNIFICANT CHANGE UP (ref 0.88–1.16)
MAGNESIUM SERPL-MCNC: 2.1 MG/DL — SIGNIFICANT CHANGE UP (ref 1.6–2.6)
MCHC RBC-ENTMCNC: 29.7 PG — SIGNIFICANT CHANGE UP (ref 27–34)
MCHC RBC-ENTMCNC: 32.8 GM/DL — SIGNIFICANT CHANGE UP (ref 32–36)
MCV RBC AUTO: 90.6 FL — SIGNIFICANT CHANGE UP (ref 80–100)
PLATELET # BLD AUTO: 122 K/UL — LOW (ref 150–400)
POTASSIUM SERPL-MCNC: 4.2 MMOL/L — SIGNIFICANT CHANGE UP (ref 3.5–5.3)
POTASSIUM SERPL-SCNC: 4.2 MMOL/L — SIGNIFICANT CHANGE UP (ref 3.5–5.3)
PROTHROM AB SERPL-ACNC: 13.3 SEC — SIGNIFICANT CHANGE UP (ref 10.6–13.6)
RBC # BLD: 2.86 M/UL — LOW (ref 3.8–5.2)
RBC # FLD: 12.7 % — SIGNIFICANT CHANGE UP (ref 10.3–14.5)
SODIUM SERPL-SCNC: 139 MMOL/L — SIGNIFICANT CHANGE UP (ref 135–145)
WBC # BLD: 16.11 K/UL — HIGH (ref 3.8–10.5)
WBC # FLD AUTO: 16.11 K/UL — HIGH (ref 3.8–10.5)

## 2021-12-02 PROCEDURE — 93010 ELECTROCARDIOGRAM REPORT: CPT

## 2021-12-02 PROCEDURE — 71045 X-RAY EXAM CHEST 1 VIEW: CPT | Mod: 26

## 2021-12-02 PROCEDURE — 99222 1ST HOSP IP/OBS MODERATE 55: CPT

## 2021-12-02 RX ORDER — ENOXAPARIN SODIUM 100 MG/ML
40 INJECTION SUBCUTANEOUS DAILY
Refills: 0 | Status: DISCONTINUED | OUTPATIENT
Start: 2021-12-02 | End: 2021-12-02

## 2021-12-02 RX ORDER — WARFARIN SODIUM 2.5 MG/1
3 TABLET ORAL ONCE
Refills: 0 | Status: COMPLETED | OUTPATIENT
Start: 2021-12-02 | End: 2021-12-02

## 2021-12-02 RX ORDER — ENOXAPARIN SODIUM 100 MG/ML
70 INJECTION SUBCUTANEOUS EVERY 12 HOURS
Refills: 0 | Status: DISCONTINUED | OUTPATIENT
Start: 2021-12-02 | End: 2021-12-09

## 2021-12-02 RX ADMIN — GABAPENTIN 300 MILLIGRAM(S): 400 CAPSULE ORAL at 13:11

## 2021-12-02 RX ADMIN — Medication 250 MILLIGRAM(S): at 10:00

## 2021-12-02 RX ADMIN — GABAPENTIN 300 MILLIGRAM(S): 400 CAPSULE ORAL at 21:22

## 2021-12-02 RX ADMIN — OXYCODONE HYDROCHLORIDE 5 MILLIGRAM(S): 5 TABLET ORAL at 11:13

## 2021-12-02 RX ADMIN — PANTOPRAZOLE SODIUM 40 MILLIGRAM(S): 20 TABLET, DELAYED RELEASE ORAL at 11:12

## 2021-12-02 RX ADMIN — SODIUM CHLORIDE 3 MILLILITER(S): 9 INJECTION INTRAMUSCULAR; INTRAVENOUS; SUBCUTANEOUS at 21:18

## 2021-12-02 RX ADMIN — Medication 100 MILLIGRAM(S): at 00:00

## 2021-12-02 RX ADMIN — Medication 30 MILLIGRAM(S): at 00:30

## 2021-12-02 RX ADMIN — ENOXAPARIN SODIUM 70 MILLIGRAM(S): 100 INJECTION SUBCUTANEOUS at 23:49

## 2021-12-02 RX ADMIN — SENNA PLUS 2 TABLET(S): 8.6 TABLET ORAL at 21:21

## 2021-12-02 RX ADMIN — LIDOCAINE 1 PATCH: 4 CREAM TOPICAL at 01:00

## 2021-12-02 RX ADMIN — GABAPENTIN 300 MILLIGRAM(S): 400 CAPSULE ORAL at 05:18

## 2021-12-02 RX ADMIN — Medication 30 MILLIGRAM(S): at 11:20

## 2021-12-02 RX ADMIN — OXYCODONE HYDROCHLORIDE 5 MILLIGRAM(S): 5 TABLET ORAL at 08:20

## 2021-12-02 RX ADMIN — HYDROMORPHONE HYDROCHLORIDE 0.25 MILLIGRAM(S): 2 INJECTION INTRAMUSCULAR; INTRAVENOUS; SUBCUTANEOUS at 09:22

## 2021-12-02 RX ADMIN — Medication 100 MILLIGRAM(S): at 11:13

## 2021-12-02 RX ADMIN — Medication 30 MILLIGRAM(S): at 05:30

## 2021-12-02 RX ADMIN — WARFARIN SODIUM 3 MILLIGRAM(S): 2.5 TABLET ORAL at 21:22

## 2021-12-02 RX ADMIN — SODIUM CHLORIDE 3 MILLILITER(S): 9 INJECTION INTRAMUSCULAR; INTRAVENOUS; SUBCUTANEOUS at 12:59

## 2021-12-02 RX ADMIN — OXYCODONE HYDROCHLORIDE 5 MILLIGRAM(S): 5 TABLET ORAL at 12:02

## 2021-12-02 RX ADMIN — METHOCARBAMOL 500 MILLIGRAM(S): 500 TABLET, FILM COATED ORAL at 07:30

## 2021-12-02 RX ADMIN — ENOXAPARIN SODIUM 70 MILLIGRAM(S): 100 INJECTION SUBCUTANEOUS at 13:11

## 2021-12-02 RX ADMIN — LIDOCAINE 1 PATCH: 4 CREAM TOPICAL at 21:21

## 2021-12-02 RX ADMIN — Medication 30 MILLIGRAM(S): at 05:16

## 2021-12-02 RX ADMIN — Medication 30 MILLIGRAM(S): at 11:13

## 2021-12-02 RX ADMIN — OXYCODONE HYDROCHLORIDE 5 MILLIGRAM(S): 5 TABLET ORAL at 21:22

## 2021-12-02 RX ADMIN — Medication 30 MILLIGRAM(S): at 00:00

## 2021-12-02 RX ADMIN — Medication 81 MILLIGRAM(S): at 11:13

## 2021-12-02 RX ADMIN — HYDROMORPHONE HYDROCHLORIDE 0.25 MILLIGRAM(S): 2 INJECTION INTRAMUSCULAR; INTRAVENOUS; SUBCUTANEOUS at 09:30

## 2021-12-02 RX ADMIN — SODIUM CHLORIDE 3 MILLILITER(S): 9 INJECTION INTRAMUSCULAR; INTRAVENOUS; SUBCUTANEOUS at 06:35

## 2021-12-02 RX ADMIN — OXYCODONE HYDROCHLORIDE 5 MILLIGRAM(S): 5 TABLET ORAL at 07:30

## 2021-12-02 NOTE — CONSULT NOTE ADULT - SUBJECTIVE AND OBJECTIVE BOX
Electrophysiology Attending Consult Note    HPI:  32 yo F   x2 LMP 10/2021 PMH of Congential Heart Disease, Bicuspid Aortic Valve, Murmur, and Severe Aortic Stenosis. At the age of 10 patient underwent an open aortic valvuloplasty under cardiopulmonary bypass on 2/15/2001. Patient is now experiencing intermittent episodes of near syncope and palpitations. She denies SOB but c/o occasional exertional dyspnea with some chest discomfort. She is a  and walks routinely throughout the day. She quit smoking 1-1/2 months ago. Preop assessment prior to reoperation sternotomy, aortic valve replacement, ascending hemiarch replacement w/Dr Munguia scheduled for 2021    Cardiac Summary:  - Echocardiogram 10/18/2021: bicuspid aortic valve bicuspid with severe stenosis. Aortic valve area 0.7 cm². Peak and mean gradients are 113 and 55 mmHg respectively. There is mild to moderate aortic valve regurgitation. The mitral valve is normal with minimal regurgitation. Biventricular function is preserved. The aortic root measures 3.5 cm and the ascending aorta measures 4.2. The aortic arch is measured at 3.6 cm.  - Cardiac catheterization 10/28/2021: The coronary anatomy is right dominant. The right coronary artery is normal but small, the left-sided circulation shows no disease. The valve was not crossed for the procedure and there were no gradients.  - CT angiogram 11/10/2021: The ascending aorta measures 4 cm the level of the main pulmonary artery. The aorta narrows down to 3.2 cm at the sinotubular junction. The aortic sinuses measure 3.0 x 2.6 cm and therefore not dilated. The descending thoracic aorta measures 2.1 cm in maximal diameter.    Above from H and P on (2021 15:14)  - Pt reports lossing to follow up for many years till earlier this year when she saw a cardiologist for ZHAO and one presyncope episode. Found to have severe AS, and aortic now s/p mechanical AVR and hemiarch replacement (19 ON-X Valve, 23 hemashield) on 2021.   - Post op she has been in CHB with junctional escape and RBBB, VR in the 50s.   - CTS/CTICU team reported high epicardial wires threshold at 9 today, but she did not have any pacing requirements so far.   - She denies any palpitation, or syncope  - Social hx: remote weed use, ex smoker, stopped 2 months ago. no alcohol or drug abuse  - family hx: 2 kids, one with PV disease. The other has been screened and has no CHD. Mother and maternal GM had BAV  - Tele this morning (in 4 tower) showed occasional conducted beats with 1 st AVB   - she has been started on warfarin with low dose LMWH, INR still sub-theraputic     PAST MEDICAL & SURGICAL HISTORY:  Thoracic aortic aneurysm, without rupture  Congenital stenosis of aortic valve  Congenital bicuspid aortic valve  History of open heart surgery  2001  H/O  section  2017, 2019  H/O tubal ligation 2019        REVIEW OF SYSTEMS:    CONSTITUTIONAL: No fever, weight loss, or fatigue  EYES: No eye pain, visual disturbances, or discharge  ENMT:  No difficulty hearing, tinnitus, vertigo; No sinus or throat pain  NECK: No pain or stiffness  RESPIRATORY: No cough, wheezing, chills or hemoptysis;   CARDIOVASCULAR: No chest pain, palpitations, or leg swelling  GASTROINTESTINAL: No abdominal or epigastric pain. No nausea, vomiting, or hematemesis; No diarrhea or constipation. No melena or hematochezia.  GENITOURINARY: No dysuria, frequency, hematuria, or incontinence  NEUROLOGICAL: No headaches, memory loss, loss of strength, numbness, or tremors  SKIN: No itching, burning, rashes, or lesions   LYMPH NODES: No enlarged glands  ENDOCRINE: No heat or cold intolerance; No hair loss  MUSCULOSKELETAL: No joint pain or swelling; No muscle, back, or extremity pain  HEME/LYMPH: No easy bruising, or bleeding gums  ALLERY AND IMMUNOLOGIC: No hives or eczema      MEDICATIONS  (STANDING):  aspirin enteric coated 81 milliGRAM(s) Oral daily  cefuroxime  IVPB 1500 milliGRAM(s) IV Intermittent every 8 hours  enoxaparin Injectable 40 milliGRAM(s) SubCutaneous daily  gabapentin 300 milliGRAM(s) Oral every 8 hours  influenza   Vaccine 0.5 milliLiter(s) IntraMuscular once  insulin lispro (ADMELOG) corrective regimen sliding scale   SubCutaneous Before meals and at bedtime  lidocaine   4% Patch 1 Patch Transdermal daily  pantoprazole    Tablet 40 milliGRAM(s) Oral daily  senna 2 Tablet(s) Oral at bedtime  sodium chloride 0.9% lock flush 3 milliLiter(s) IV Push every 8 hours  vancomycin  IVPB 1000 milliGRAM(s) IV Intermittent every 12 hours  warfarin 3 milliGRAM(s) Oral once    MEDICATIONS  (PRN):  HYDROmorphone  Injectable 0.25 milliGRAM(s) IV Push every 3 hours PRN breakthrough  methocarbamol 500 milliGRAM(s) Oral every 8 hours PRN Muscle Spasm  oxyCODONE    IR 5 milliGRAM(s) Oral every 4 hours PRN Moderate Pain (4 - 6)  oxyCODONE    IR 10 milliGRAM(s) Oral every 4 hours PRN Severe Pain (7 - 10)      Allergies  - penicillin (Unknown)      Vital Signs Last 24 Hrs  T(C): 37.6 (02 Dec 2021 08:00), Max: 37.6 (02 Dec 2021 08:00)  T(F): 99.6 (02 Dec 2021 08:00), Max: 99.6 (02 Dec 2021 08:00)  HR: 59 (02 Dec 2021 09:00) (51 - 61)  BP: 105/51 (02 Dec 2021 09:00) (101/49 - 119/62)  BP(mean): 74 (02 Dec 2021 09:00) (71 - 84)  RR: 30 (02 Dec 2021 09:00) (18 - 30)  SpO2: 95% (02 Dec 2021 09:00) (92% - 98%)    Physical Exam:  Constitutional: AAOx3, NAD  Neck: supple, No JVD  Cardiovascular: +S1S2 RRR. 2/6 BARB. Could not hear clear metallic closing sounds   Pulmonary: , no wheezes, rales. rhonci  Abdomen:  soft NTND  Extremities: no edema b/l,   Neuro: non focal, speech clear, BORDEN x 4    LABS:                        8.5    16.11 )-----------( 122      ( 02 Dec 2021 03:09 )             25.9   12-02    139  |  104  |  14.7  ----------------------------<  140<H>  4.2   |  25.0  |  0.52    Ca    8.2<L>      02 Dec 2021 03:09  Mg     2.1     12-02    TPro  5.0<L>  /  Alb  3.7  /  TBili  0.9  /  DBili  x   /  AST  28  /  ALT  10  /  AlkPhos  31<L>  11  LIVER FUNCTIONS - ( 2021 16:23 )  Alb: 3.7 g/dL / Pro: 5.0 g/dL / ALK PHOS: 31 U/L / ALT: 10 U/L / AST: 28 U/L / GGT: x           PT/INR - ( 02 Dec 2021 03:09 )   PT: 13.3 sec;   INR: 1.15 ratio         PTT - ( 02 Dec 2021 03:09 )  PTT:28.2 sec        RADIOLOGY & ADDITIONAL STUDIES:  EKG 21 : SR with CHB and J. escape with RBBB, VR at 57 bpm.  ms  EKG 21 :  SR with CHB and J. escape with IVCD, VR at 55 bpm.  ms   EKG 21 16:02: SR with IVCD,  ms,   EKG 2021: SB, normal QRS/NE

## 2021-12-02 NOTE — PROGRESS NOTE ADULT - PROBLEM SELECTOR PLAN 1
redosternotomy, aortic valve replacement, ascending hemiarch replacement 11/30  - Pain control with oxy ir and tylenol prn  - Encourage DBE/IS, wean NC as toelrated   - Daily CXR  - hold lorpessor in setting of epsidoe of CHB  - Asa therapy for graft occlusion   - Protonix for GI ppx  - Strict i/o's  - Chemical DVT ppx with lovenox, maintain SCD's for mechanical DVT ppx

## 2021-12-02 NOTE — CONSULT NOTE ADULT - ASSESSMENT
32 yo F   x2 LMP 10/2021 PMH of Congential Heart Disease, Bicuspid Aortic Valve, Murmur, and Severe Aortic Stenosis. At the age of 10 patient underwent an open aortic valvuloplasty under cardiopulmonary bypass on 2/15/2001. Patient is now experiencing intermittent episodes of near syncope and palpitations. She denies SOB but c/o occasional exertional dyspnea with some chest discomfort. She is a  and walks routinely throughout the day. She quit smoking 1-1/2 months ago. Preop assessment prior to reoperation sternotomy, aortic valve replacement, ascending hemiarch replacement w/Dr Munguia scheduled for 2021    Above from H and P on (2021 15:14)  - Pt reports lossing to follow up for many years till earlier this year when she saw a cardiologist for ZHAO and one presyncope episode. Found to have severe AS, and aortic now s/p mechanical AVR and hemiarch replacement (19 ON-X Valve, 23 hemashield) on 2021.   - Post op she has been in CHB with junctional escape and RBBB, VR in the 50s.   - CTS/CTICU team reported high epicardial wires threshold at 9 today, but she did not have any pacing requirements so far.   - She denies any palpitation, or syncope  - Social hx: remote weed use, ex smoker, stopped 2 months ago. no alcohol or drug abuse  - family hx: 2 kids, one with PV disease. The other has been screened and has no CHD. Mother and maternal GM had BAV  - Tele this morning (in 4 tower) showed occasional conducted beats with 1 st AVB   - she has been started on warfarin with low dose LMWH, INR still sub-theraputic     1. CHB post AVR (2021)  - Noted signs of occasional AV conduction on tele today.   - Will d/w Dr. Munguia about the extent of surgical repair done to help better predict the chance of AV conduction recovery.   - Early for decision regarding PPM, but also rising threshold. To monitor pacing threshold closely and c/w tele. Will r/a tomorrow.   - Ideally would try to avoid PPM in this young patient. Will try to wait to POD 5 if can be done safely (i.e with reliable backup pacing while waiting). If remained in CHB/high grade AVb by day 5, then will consider and d/w pt/CTS the options of 1) transvenous PPM with physiologic pacing (CRTP or HIS), vs 2) a Micra AV with the hope that she may have later recovery in 1-2 months. To be further d/w Dr. Munguia    2. BAV and AS, s/p mechanical AVR and timothy-arch as above. BARB on examination but otherwise stable. Earlier notes indicated no murmur. d/w CTS NPs and suggested an echo to assess valve function     will follow up

## 2021-12-02 NOTE — PROGRESS NOTE ADULT - SUBJECTIVE AND OBJECTIVE BOX
Subjective: no c/o incisional pain at this time. Denies CP, SOB, palpitations, N/V, other c/o.    T(C): 37.4 (21 @ 00:00), Max: 37.4 (21 @ 00:00)  HR: 60 (21 @ 23:00) (50 - 60)  BP: 119/61 (21 @ 23:00) (90/46 - 119/61)  ABP: 76/58 (21 @ 03:00) (76/58 - 100/60)  ABP(mean): 68 (21 @ 03:00) (68 - 78)  RR: 30 (21 @ 23:00) (18 - 32)  SpO2: 95% (21 @ 23:00) (93% - 100%)  Wt(kg): --  CVP(mm Hg): 9 (21 @ 10:00) (8 - 16)  CO: 5 (21 @ 03:00) (5 - 5.3)  CI: 2.9 (21 @ 03:00) (2.9 - 3.1)  PA:  (21 @ 03:00) ( - )       I&O's Detail    2021 07:01  -  01 Dec 2021 07:00  --------------------------------------------------------  IN:    Insulin: 17 mL    IV PiggyBack: 450 mL    Lactated Ringers Bolus: 1500 mL    sodium chloride 0.9%: 160 mL    sodium chloride 0.9%: 80 mL  Total IN: 2207 mL    OUT:    Chest Tube (mL): 135 mL    Chest Tube (mL): 55 mL    Chest Tube (mL): 120 mL    Chest Tube (mL): 70 mL    Dexmedetomidine: 0 mL    Indwelling Catheter - Urethral (mL): 1065 mL    NiCARdipine: 0 mL  Total OUT: 1445 mL    Total NET: 762 mL      01 Dec 2021 07:01  -  02 Dec 2021 01:52  --------------------------------------------------------  IN:    IV PiggyBack: 250 mL    IV PiggyBack: 55 mL    IV PiggyBack: 100 mL    IV PiggyBack: 500 mL    sodium chloride 0.9%: 30 mL    sodium chloride 0.9%: 15 mL  Total IN: 950 mL    OUT:    Chest Tube (mL): 0 mL    Chest Tube (mL): 20 mL    Chest Tube (mL): 75 mL    Chest Tube (mL): 73 mL    Incontinent per Collection Bag (mL): 800 mL    Indwelling Catheter - Urethral (mL): 85 mL    Voided (mL): 300 mL  Total OUT: 1353 mL    Total NET: -403 mL          LABS: All Lab data reviewed and analyzed                        8.7    16. )-----------( 131      ( 01 Dec 2021 02:31 )             26.7     12-    140  |  109<H>  |  8.4  ----------------------------<  144<H>  4.3   |  21.0<L>  |  0.39<L>    Ca    7.6<L>      01 Dec 2021 02:31  Mg     1.9     12-    TPro  5.0<L>  /  Alb  3.7  /  TBili  0.9  /  DBili  x   /  AST  28  /  ALT  10  /  AlkPhos  31<L>  11-30    PT/INR - ( 2021 16:23 )   PT: 16.1 sec;   INR: 1.41 ratio         PTT - ( 2021 16:23 )  PTT:29.5 sec      ABG - ( 2021 16:03 )  pH, Arterial: 7.420 pH, Blood: x     /  pCO2: 38    /  pO2: 166   / HCO3: 25    / Base Excess: 0.1   /  SaO2: 99.8              CAPILLARY BLOOD GLUCOSE  104 (01 Dec 2021 05:00)  134 (01 Dec 2021 03:00)  142 (01 Dec 2021 02:00)      POCT Blood Glucose.: 149 mg/dL (01 Dec 2021 21:29)  POCT Blood Glucose.: 134 mg/dL (01 Dec 2021 17:30)  POCT Blood Glucose.: 111 mg/dL (01 Dec 2021 12:18)  POCT Blood Glucose.: 132 mg/dL (01 Dec 2021 08:08)  POCT Blood Glucose.: 104 mg/dL (01 Dec 2021 05:09)  POCT Blood Glucose.: 134 mg/dL (01 Dec 2021 02:53)  POCT Blood Glucose.: 142 mg/dL (01 Dec 2021 02:18)           RADIOLOGY: - Reviewed and analyzed   CXR: pending    HOSPITAL MEDICATIONS: All medications reviewed and analyzed  MEDICATIONS  (STANDING):  aspirin enteric coated 81 milliGRAM(s) Oral daily  cefuroxime  IVPB 1500 milliGRAM(s) IV Intermittent every 8 hours  gabapentin 300 milliGRAM(s) Oral every 8 hours  influenza   Vaccine 0.5 milliLiter(s) IntraMuscular once  insulin lispro (ADMELOG) corrective regimen sliding scale   SubCutaneous Before meals and at bedtime  ketorolac   Injectable 30 milliGRAM(s) IV Push every 6 hours  lidocaine   4% Patch 1 Patch Transdermal daily  pantoprazole    Tablet 40 milliGRAM(s) Oral daily  senna 2 Tablet(s) Oral at bedtime  sodium chloride 0.9% lock flush 3 milliLiter(s) IV Push every 8 hours  vancomycin  IVPB 1000 milliGRAM(s) IV Intermittent every 12 hours    MEDICATIONS  (PRN):  HYDROmorphone  Injectable 0.25 milliGRAM(s) IV Push every 3 hours PRN breakthrough  methocarbamol 500 milliGRAM(s) Oral every 8 hours PRN Muscle Spasm  oxyCODONE    IR 5 milliGRAM(s) Oral every 4 hours PRN Moderate Pain (4 - 6)  oxyCODONE    IR 10 milliGRAM(s) Oral every 4 hours PRN Severe Pain (7 - 10)          Lines:     Physical Exam  Neuro: A+O x 3, non-focal, speech clear and intact  HEENT:  NCAT, PERRL, EOMI. No conjuctival edema or iIcterus, no thrush.  No ETT or NGT/OGT  Neck:  ROM intact, no JVD, no nodes or masses palpable, trachea midline, no tracheostomy  Pulm: CTA, good air entry, equal bilaterally, no rales/rhonchi/wheezing, no accessory muscle use noted  Chest:        PB CT and left pleural CT all with dressings intact and no air leak, no subQ emphysema       +PW attatched to pacing box  CV: RRR, S1, S2. No murmurs, rubs, or gallops noted.  Abd: +BSx4. Soft, nontender, nondistended.  : valadez in situ to bedside drainage  Ext: BORDEN x 4, no edema, no cyanosis or clubbing, distal motor/neuro/circ intact  Skin: warm, dry, no rashes  Incisions: midsternal C/D/I/stable w/ dressing        Case including assessment/plan of care discussed with   CT surgery attending.  Critical Care time:   35   minutes of noncontinuos critical care time spent evaluating/treating, reviewing imaging, labs, discussing case with multidisciplinary team, discussing plans/goals of care with patient/family to prevent further life threatening depreciation of the patient's condition. Non-inclusive is procedure time.       31yFemale with PMH     Replacement, aortic valve, with DOM    Replacement, ascending aorta and hemiarch        PAST MEDICAL & SURGICAL HISTORY:  Thoracic aortic aneurysm, without rupture    Congenital stenosis of aortic valve    Congenital bicuspid aortic valve    History of open heart surgery  2001    H/O  section  2017, 2019    H/O tubal ligation  2019

## 2021-12-02 NOTE — PROGRESS NOTE ADULT - ASSESSMENT
32 yo F   x2 LMP 10/2021 PMH of Congential Heart Disease, Bicuspid Aortic Valve, Murmur, and Severe Aortic Stenosis. At the age of 10 patient underwent an open aortic valvuloplasty under cardiopulmonary bypass on 2/15/2001. Patient is now experiencing intermittent episodes of near syncope and palpitations. She denies SOB but c/o occasional exertional dyspnea with some chest discomfort. She is a  and walks routinely throughout the day. She quit smoking 1-1/2 months ago. Preop assessment prior to reoperation sternotomy, mechanical aortic valve replacement, ascending hemiarch replacement w/Dr Munguia scheduled for 2021 SDA for plaed AVR, hemiarch replacement (19 ON-X Valve, 23 hemashield).  no beta-blocker d/t CHB.   heart block resolved

## 2021-12-03 DIAGNOSIS — I44.2 ATRIOVENTRICULAR BLOCK, COMPLETE: ICD-10-CM

## 2021-12-03 LAB
ANION GAP SERPL CALC-SCNC: 13 MMOL/L — SIGNIFICANT CHANGE UP (ref 5–17)
APTT BLD: 32.4 SEC — SIGNIFICANT CHANGE UP (ref 27.5–35.5)
BUN SERPL-MCNC: 16.4 MG/DL — SIGNIFICANT CHANGE UP (ref 8–20)
CALCIUM SERPL-MCNC: 8 MG/DL — LOW (ref 8.6–10.2)
CHLORIDE SERPL-SCNC: 103 MMOL/L — SIGNIFICANT CHANGE UP (ref 98–107)
CO2 SERPL-SCNC: 23 MMOL/L — SIGNIFICANT CHANGE UP (ref 22–29)
CREAT SERPL-MCNC: 0.53 MG/DL — SIGNIFICANT CHANGE UP (ref 0.5–1.3)
GLUCOSE BLDC GLUCOMTR-MCNC: 101 MG/DL — HIGH (ref 70–99)
GLUCOSE SERPL-MCNC: 101 MG/DL — HIGH (ref 70–99)
HCT VFR BLD CALC: 23.5 % — LOW (ref 34.5–45)
HGB BLD-MCNC: 7.8 G/DL — LOW (ref 11.5–15.5)
INR BLD: 1.32 RATIO — HIGH (ref 0.88–1.16)
MAGNESIUM SERPL-MCNC: 2 MG/DL — SIGNIFICANT CHANGE UP (ref 1.6–2.6)
MCHC RBC-ENTMCNC: 30.1 PG — SIGNIFICANT CHANGE UP (ref 27–34)
MCHC RBC-ENTMCNC: 33.2 GM/DL — SIGNIFICANT CHANGE UP (ref 32–36)
MCV RBC AUTO: 90.7 FL — SIGNIFICANT CHANGE UP (ref 80–100)
PLATELET # BLD AUTO: 127 K/UL — LOW (ref 150–400)
POTASSIUM SERPL-MCNC: 4.1 MMOL/L — SIGNIFICANT CHANGE UP (ref 3.5–5.3)
POTASSIUM SERPL-SCNC: 4.1 MMOL/L — SIGNIFICANT CHANGE UP (ref 3.5–5.3)
PROTHROM AB SERPL-ACNC: 15.1 SEC — HIGH (ref 10.6–13.6)
RBC # BLD: 2.59 M/UL — LOW (ref 3.8–5.2)
RBC # FLD: 12.5 % — SIGNIFICANT CHANGE UP (ref 10.3–14.5)
SODIUM SERPL-SCNC: 139 MMOL/L — SIGNIFICANT CHANGE UP (ref 135–145)
WBC # BLD: 10.51 K/UL — HIGH (ref 3.8–10.5)
WBC # FLD AUTO: 10.51 K/UL — HIGH (ref 3.8–10.5)

## 2021-12-03 PROCEDURE — 93010 ELECTROCARDIOGRAM REPORT: CPT

## 2021-12-03 PROCEDURE — 99232 SBSQ HOSP IP/OBS MODERATE 35: CPT

## 2021-12-03 PROCEDURE — 71045 X-RAY EXAM CHEST 1 VIEW: CPT | Mod: 26

## 2021-12-03 RX ORDER — POLYETHYLENE GLYCOL 3350 17 G/17G
17 POWDER, FOR SOLUTION ORAL
Refills: 0 | Status: DISCONTINUED | OUTPATIENT
Start: 2021-12-03 | End: 2021-12-09

## 2021-12-03 RX ORDER — WARFARIN SODIUM 2.5 MG/1
5 TABLET ORAL ONCE
Refills: 0 | Status: COMPLETED | OUTPATIENT
Start: 2021-12-03 | End: 2021-12-03

## 2021-12-03 RX ADMIN — GABAPENTIN 300 MILLIGRAM(S): 400 CAPSULE ORAL at 05:44

## 2021-12-03 RX ADMIN — OXYCODONE HYDROCHLORIDE 10 MILLIGRAM(S): 5 TABLET ORAL at 13:34

## 2021-12-03 RX ADMIN — POLYETHYLENE GLYCOL 3350 17 GRAM(S): 17 POWDER, FOR SOLUTION ORAL at 17:01

## 2021-12-03 RX ADMIN — OXYCODONE HYDROCHLORIDE 10 MILLIGRAM(S): 5 TABLET ORAL at 20:48

## 2021-12-03 RX ADMIN — SODIUM CHLORIDE 3 MILLILITER(S): 9 INJECTION INTRAMUSCULAR; INTRAVENOUS; SUBCUTANEOUS at 05:34

## 2021-12-03 RX ADMIN — SENNA PLUS 2 TABLET(S): 8.6 TABLET ORAL at 20:49

## 2021-12-03 RX ADMIN — OXYCODONE HYDROCHLORIDE 5 MILLIGRAM(S): 5 TABLET ORAL at 03:30

## 2021-12-03 RX ADMIN — LIDOCAINE 1 PATCH: 4 CREAM TOPICAL at 06:40

## 2021-12-03 RX ADMIN — PANTOPRAZOLE SODIUM 40 MILLIGRAM(S): 20 TABLET, DELAYED RELEASE ORAL at 09:15

## 2021-12-03 RX ADMIN — OXYCODONE HYDROCHLORIDE 10 MILLIGRAM(S): 5 TABLET ORAL at 10:10

## 2021-12-03 RX ADMIN — WARFARIN SODIUM 5 MILLIGRAM(S): 2.5 TABLET ORAL at 20:49

## 2021-12-03 RX ADMIN — OXYCODONE HYDROCHLORIDE 10 MILLIGRAM(S): 5 TABLET ORAL at 14:30

## 2021-12-03 RX ADMIN — OXYCODONE HYDROCHLORIDE 5 MILLIGRAM(S): 5 TABLET ORAL at 02:33

## 2021-12-03 RX ADMIN — LIDOCAINE 1 PATCH: 4 CREAM TOPICAL at 20:50

## 2021-12-03 RX ADMIN — SODIUM CHLORIDE 3 MILLILITER(S): 9 INJECTION INTRAMUSCULAR; INTRAVENOUS; SUBCUTANEOUS at 20:51

## 2021-12-03 RX ADMIN — OXYCODONE HYDROCHLORIDE 10 MILLIGRAM(S): 5 TABLET ORAL at 09:17

## 2021-12-03 RX ADMIN — ENOXAPARIN SODIUM 70 MILLIGRAM(S): 100 INJECTION SUBCUTANEOUS at 20:50

## 2021-12-03 RX ADMIN — LIDOCAINE 1 PATCH: 4 CREAM TOPICAL at 09:17

## 2021-12-03 RX ADMIN — GABAPENTIN 300 MILLIGRAM(S): 400 CAPSULE ORAL at 20:50

## 2021-12-03 RX ADMIN — GABAPENTIN 300 MILLIGRAM(S): 400 CAPSULE ORAL at 13:34

## 2021-12-03 RX ADMIN — Medication 81 MILLIGRAM(S): at 09:15

## 2021-12-03 RX ADMIN — OXYCODONE HYDROCHLORIDE 10 MILLIGRAM(S): 5 TABLET ORAL at 21:30

## 2021-12-03 RX ADMIN — ENOXAPARIN SODIUM 70 MILLIGRAM(S): 100 INJECTION SUBCUTANEOUS at 09:15

## 2021-12-03 RX ADMIN — SODIUM CHLORIDE 3 MILLILITER(S): 9 INJECTION INTRAMUSCULAR; INTRAVENOUS; SUBCUTANEOUS at 12:48

## 2021-12-03 NOTE — PROGRESS NOTE ADULT - ASSESSMENT
30 yo F   x2 LMP 10/2021 PMH of Congential Heart Disease, Bicuspid Aortic Valve, Murmur, and Severe Aortic Stenosis. At the age of 10 patient underwent an open aortic valvuloplasty under cardiopulmonary bypass on 2/15/2001. Patient is now experiencing intermittent episodes of near syncope and palpitations. She denies SOB but c/o occasional exertional dyspnea with some chest discomfort. She is a  and walks routinely throughout the day. She quit smoking 1-1/2 months ago. Preop assessment prior to reoperation sternotomy, aortic valve replacement, ascending hemiarch replacement w/Dr Munguia scheduled for 2021    Above from H and P on (2021 15:14)  - Pt reports lossing to follow up for many years till earlier this year when she saw a cardiologist for ZHAO and one presyncope episode. Found to have severe AS, and aortic now s/p mechanical AVR and hemiarch replacement (19 ON-X Valve, 23 hemashield) on 2021.   - Post op she has been in CHB with junctional escape and RBBB, VR in the 50s.   - CTS/CTICU team reported high epicardial wires threshold at 9 today, but she did not have any pacing requirements so far.   - She denies any palpitation, or syncope  - Social hx: remote weed use, ex smoker, stopped 2 months ago. no alcohol or drug abuse  - family hx: 2 kids, one with PV disease. The other has been screened and has no CHD. Mother and maternal GM had BAV  - Tele this morning (in 4 tower) showed occasional conducted beats with 1 st AVB   - she has been started on warfarin with low dose LMWH, INR still sub-theraputic     Interval Hx -12/3:  - Doing well, feeling better, one CT is out.   - Overnight rhythm was sinus with alternating CHB, second degree AVB. Now tele shows SR with 1st AVB and one to one conduction.   - EKG : SR, 1st AVB,  ms and LBBB,  ms.     1. CHB post AVR (2021)  - She is having 1 :1 conduction now, but was in CHB till few hours ago. Pacing wires threshold is high (11) but she is not needing it  - Monitor for at least another 24-48 hours and ideally 72 hours. If 1:1 conduction over the weekend, can consider atrial pacing at bedside from epicardial wires to further assess AV xiomara conduction if no AVB seen on tele.  - If recurrent high grade AVB, CHB or alternating BBB that persisted by MOnday (POD 5), then will plan for Micra AV on Monday afternoon with the hope that she will have late recovery. Will strongly favor avoiding a transvenous PM. A leadless AV PM from [employer] (with actual A and V devices will likely be available to us soon as part of a study) which can be considered in the future if she had persistent CHB and suboptimal results with micra AV.      2. BAV and AS, s/p mechanical AVR and timothy-arch as above.     will follow up   above d/w patient and Dr. Munguia

## 2021-12-03 NOTE — PROGRESS NOTE ADULT - ASSESSMENT
31 year old female former smoker, ,  x 2, with a medical history of Congential Heart Disease, Bicuspid Aortic Valve, Murmur, Severe Aortic Stenosis s/p open aortic valvuloplasty under cardiopulmonary bypass on 2/15/2001, now with c/o episodes of near syncope, ZHAO, chest discomfort, and palpitations. Patient underwent AVR (#19 On-X mechanical valve) and replacement of ascending aorta (hemiarch with #24 Gelweave graft) on 21 with Dr. Munguia. Postoperative course significant for complete heart block (beta-blocker held, EPWs remain in place, no pacing requirement noted, maintaining adequate BP, EP following).

## 2021-12-03 NOTE — PROGRESS NOTE ADULT - SUBJECTIVE AND OBJECTIVE BOX
POD # 3 s/p AVR (#19 On-X mechanical valve) and replacemment of ascending aorta (hemiarch with # 24 Gelweave graft)    HPI:  30 yo F   x2 LMP 10/2021 PMH of Congential Heart Disease, Bicuspid Aortic Valve, Murmur, and Severe Aortic Stenosis. At the age of 10 patient underwent an open aortic valvuloplasty under cardiopulmonary bypass on 2/15/2001. Patient is now experiencing intermittent episodes of near syncope and palpitations. She denies SOB but c/o occasional exertional dyspnea with some chest discomfort. She is a  and walks routinely throughout the day. She quit smoking 1-1/2 months ago. Preop assessment prior to reoperation sternotomy, aortic valve replacement, ascending hemiarch replacement w/Dr Munguia scheduled for 2021    Cardiac Summary:  Echocardiogram 10/18/2021: bicuspid aortic valve bicuspid with severe stenosis. Aortic valve area 0.7 cm². Peak and mean gradients are 113 and 55 mmHg respectively. There is mild to moderate aortic valve regurgitation. The mitral valve is normal with minimal regurgitation. Biventricular function is preserved. The aortic root measures 3.5 cm and the ascending aorta measures 4.2. The aortic arch is measured at 3.6 cm.    Cardiac catheterization 10/28/2021: The coronary anatomy is right dominant. The right coronary artery is normal but small, the left-sided circulation shows no disease. The valve was not crossed for the procedure and there were no gradients.    CT angiogram 11/10/2021: The ascending aorta measures 4 cm the level of the main pulmonary artery. The aorta narrows down to 3.2 cm at the sinotubular junction. The aortic sinuses measure 3.0 x 2.6 cm and therefore not dilated. The descending thoracic aorta measures 2.1 cm in maximal diameter. (2021 15:14)    PAST MEDICAL & SURGICAL HISTORY:  Thoracic aortic aneurysm, without rupture  Congenital stenosis of aortic valve  Congenital bicuspid aortic valve  History of open heart surgery, 2001  H/O  section, 2017, 2019  H/O tubal ligation, 2019    FAMILY HISTORY:  FH: diabetes mellitus (Mother)  FH: aortic stenosis (Mother, Grandparent)    Brief Hospital Course: 31 year old female former smoker, ,  x 2, with a medical history of Congential Heart Disease, Bicuspid Aortic Valve, Murmur, Severe Aortic Stenosis s/p open aortic valvuloplasty under cardiopulmonary bypass on 2/15/2001, now with c/o episodes of near syncope, ZHAO, chest discomfort, and palpitations. Patient underwent AVR (#19 On-X mechanical valve) and replacement of ascending aorta (hemiarch with #24 Gelweave graft) on 21 with Dr. Munguia. Postoperative course significant for complete heart block (beta-blocker held, EPWs remain in place, no pacing requirement noted, maintaining adequate BP, EP following).    Significant recent/past 24 hr events: No overnight events reported.     Subjective: Patient lying in bed with head elevated in NAD. Denies fevers, chills, lightheadedness, dizziness, HA, CP, palpitations, SOB, cough, abdominal pain, N/V, diarrhea, numbness/tingling in extremities, or any other acute complaints. ROS negative x 10 systems except as noted above.    MEDICATIONS  (STANDING):  aspirin enteric coated 81 milliGRAM(s) Oral daily  cefuroxime  IVPB 1500 milliGRAM(s) IV Intermittent every 8 hours  enoxaparin Injectable 70 milliGRAM(s) SubCutaneous every 12 hours  gabapentin 300 milliGRAM(s) Oral every 8 hours  influenza   Vaccine 0.5 milliLiter(s) IntraMuscular once  lidocaine   4% Patch 1 Patch Transdermal daily  pantoprazole    Tablet 40 milliGRAM(s) Oral daily  senna 2 Tablet(s) Oral at bedtime  sodium chloride 0.9% lock flush 3 milliLiter(s) IV Push every 8 hours    MEDICATIONS  (PRN):  methocarbamol 500 milliGRAM(s) Oral every 8 hours PRN Muscle Spasm  oxyCODONE    IR 5 milliGRAM(s) Oral every 4 hours PRN Moderate Pain (4 - 6)  oxyCODONE    IR 10 milliGRAM(s) Oral every 4 hours PRN Severe Pain (7 - 10)    Allergies: penicillin (Unknown)    Vitals   T(C): 36.9 (02 Dec 2021 21:00), Max: 37.6 (02 Dec 2021 08:00)  T(F): 98.5 (02 Dec 2021 21:00), Max: 99.6 (02 Dec 2021 08:00)  HR: 55 (03 Dec 2021 02:35) (55 - 59)  BP: 104/64 (03 Dec 2021 02:35) (92/58 - 112/65)  BP(mean): 74 (02 Dec 2021 09:00) (71 - 78)  RR: 18 (02 Dec 2021 21:00) (18 - 32)  SpO2: 95% (02 Dec 2021 21:00) (92% - 95%)    I&O's Detail    01 Dec 2021 07:01  -  02 Dec 2021 07:00  --------------------------------------------------------  IN:    IV PiggyBack: 150 mL    IV PiggyBack: 55 mL    IV PiggyBack: 500 mL    IV PiggyBack: 250 mL    sodium chloride 0.9%: 30 mL    sodium chloride 0.9%: 15 mL  Total IN: 1000 mL    OUT:    Chest Tube (mL): 195 mL    Chest Tube (mL): 135 mL    Chest Tube (mL): 0 mL    Chest Tube (mL): 20 mL    Incontinent per Collection Bag (mL): 800 mL    Indwelling Catheter - Urethral (mL): 85 mL    Voided (mL): 300 mL  Total OUT: 1535 mL    Total NET: -535 mL      02 Dec 2021 07:01  -  03 Dec 2021 03:00  --------------------------------------------------------  IN:    IV PiggyBack: 50 mL    Oral Fluid: 60 mL  Total IN: 110 mL    OUT:    Chest Tube (mL): 0 mL    Chest Tube (mL): 60 mL    Voided (mL): 500 mL  Total OUT: 560 mL    Total NET: -450 mL    Physical Exam  Neuro: A+O x 3, non-focal, speech clear and intact  HEENT:  NCAT, PERRL, EOMI. No conjuctival edema or icterus, no thrush.    Neck:  Supple, trachea midline, no tracheostomy  Pulm: CTA, good air entry, equal bilaterally, no rales/rhonchi/wheezing, no accessory muscle use noted  Chest:        mediastinal PB CT with dressing intact and no air leak, no subQ emphysema       +PW (settings: VVI, rate: 50 mA: 20, threshold: 9, sensitivity: 0.8)  CV: milady rate, remains CHB, +BARB  Abd: soft, NT, ND, + BS  Ext: BORDEN x 4, trace LE pitting edema b/l, no cyanosis or clubbing, distal motor/neuro/circ intact  Skin: warm, dry, perfused  Incisions: midsternal mepilex C/D/I, sternum stable    LABS                        8.5    16.11 )-----------( 122      ( 02 Dec 2021 03:09 )             25.9     12    139  |  104  |  14.7  ----------------------------<  140<H>  4.2   |  25.0  |  0.52    Ca    8.2<L>      02 Dec 2021 03:09  Mg     2.1     12    PT/INR - ( 02 Dec 2021 03:09 )   PT: 13.3 sec;   INR: 1.15 ratio       PTT - ( 02 Dec 2021 03:09 )  PTT:28.2 sec    POCT Blood Glucose.: 135 mg/dL (21 @ 11:20)  POCT Blood Glucose.: 112 mg/dL (21 @ 07:41)    Last CXR:  < from: Xray Chest 1 View- PORTABLE-Urgent (Xray Chest 1 View- PORTABLE-Urgent .) (21 @ 12:15) >  Heart magnified by technique.Sternotomy, prosthetic heart valve, left chest tube, and mediastinal drain remain.  There are improved central infiltrates compared to .  Follow-up AP erect chest on 2021 at 11:53 AM.  Left chest tube is been removed. No pneumothorax.  IMPRESSION: Uneventful removal of left chest tube. Persistent mild lung findings at the lung bases.  < end of copied text >

## 2021-12-03 NOTE — PROGRESS NOTE ADULT - PROBLEM SELECTOR PLAN 1
S/p Redo-sternotomy, aortic valve replacement, ascending hemiarch replacement 11/30  Neurologically intact.  Hemodynamically stable.  Continue aspirin.  Lovenox Bridging to Coumadin in setting of mechanical valve.   Holding Lopressor in setting of episode of CHB.  Oxygenating well, coughing and deep breathing exercises/incentive spirometry encouraged  D/c PB CT this morning if output remains minimal.   Replete electrolytes PRN.   Continue with PT, increase activity as tolerated.  FS AC+HS with coverage for glycemic control.  Toradol PRN for analgesia.  Plan to be discussed / reviewed with CT Surgery attending / team during AM rounds.

## 2021-12-03 NOTE — PROGRESS NOTE ADULT - SUBJECTIVE AND OBJECTIVE BOX
Electrophysiology Attending Consult Note    HPI:  30 yo F   x2 LMP 10/2021 PMH of Congential Heart Disease, Bicuspid Aortic Valve, Murmur, and Severe Aortic Stenosis. At the age of 10 patient underwent an open aortic valvuloplasty under cardiopulmonary bypass on 2/15/2001. Patient is now experiencing intermittent episodes of near syncope and palpitations. She denies SOB but c/o occasional exertional dyspnea with some chest discomfort. She is a  and walks routinely throughout the day. She quit smoking 1-1/2 months ago. Preop assessment prior to reoperation sternotomy, aortic valve replacement, ascending hemiarch replacement w/Dr Munguia scheduled for 2021    Cardiac Summary:  - Echocardiogram 10/18/2021: bicuspid aortic valve bicuspid with severe stenosis. Aortic valve area 0.7 cm². Peak and mean gradients are 113 and 55 mmHg respectively. There is mild to moderate aortic valve regurgitation. The mitral valve is normal with minimal regurgitation. Biventricular function is preserved. The aortic root measures 3.5 cm and the ascending aorta measures 4.2. The aortic arch is measured at 3.6 cm.  - Cardiac catheterization 10/28/2021: The coronary anatomy is right dominant. The right coronary artery is normal but small, the left-sided circulation shows no disease. The valve was not crossed for the procedure and there were no gradients.  - CT angiogram 11/10/2021: The ascending aorta measures 4 cm the level of the main pulmonary artery. The aorta narrows down to 3.2 cm at the sinotubular junction. The aortic sinuses measure 3.0 x 2.6 cm and therefore not dilated. The descending thoracic aorta measures 2.1 cm in maximal diameter.    Above from H and P on (2021 15:14)  - Pt reports lossing to follow up for many years till earlier this year when she saw a cardiologist for ZHAO and one presyncope episode. Found to have severe AS, and aortic now s/p mechanical AVR and hemiarch replacement (19 ON-X Valve, 23 hemashield) on 2021.   - Post op she has been in CHB with junctional escape and RBBB, VR in the 50s.   - CTS/CTICU team reported high epicardial wires threshold at 9 today, but she did not have any pacing requirements so far.   - She denies any palpitation, or syncope  - Social hx: remote weed use, ex smoker, stopped 2 months ago. no alcohol or drug abuse  - family hx: 2 kids, one with PV disease. The other has been screened and has no CHD. Mother and maternal GM had BAV  - Tele this morning (in 4 tower) showed occasional conducted beats with 1 st AVB   - she has been started on warfarin with low dose LMWH, INR still sub-theraputic     Interval Hx -12/3:  - Doing well, feeling better, one CT is out.   - Overnight rhythm was sinus with alternating CHB, second degree AVB. Now tele shows SR with 1st AVB and one to one conduction.   - EKG : SR, 1st AVB,  ms and LBBB,  ms.       PAST MEDICAL & SURGICAL HISTORY:  Thoracic aortic aneurysm, without rupture  Congenital stenosis of aortic valve  Congenital bicuspid aortic valve  History of open heart surgery  2001  H/O  section  2017, 2019  H/O tubal ligation 2019        REVIEW OF SYSTEMS:    CONSTITUTIONAL: No fever, weight loss, or fatigue  EYES: No eye pain, visual disturbances, or discharge  ENMT:  No difficulty hearing, tinnitus, vertigo; No sinus or throat pain  NECK: No pain or stiffness  RESPIRATORY: No cough, wheezing, chills or hemoptysis;   CARDIOVASCULAR: No chest pain, palpitations, or leg swelling  GASTROINTESTINAL: No abdominal or epigastric pain. No nausea, vomiting, or hematemesis; No diarrhea or constipation. No melena or hematochezia.  GENITOURINARY: No dysuria, frequency, hematuria, or incontinence  NEUROLOGICAL: No headaches, memory loss, loss of strength, numbness, or tremors  SKIN: No itching, burning, rashes, or lesions   LYMPH NODES: No enlarged glands  ENDOCRINE: No heat or cold intolerance; No hair loss  MUSCULOSKELETAL: No joint pain or swelling; No muscle, back, or extremity pain  HEME/LYMPH: No easy bruising, or bleeding gums  ALLERY AND IMMUNOLOGIC: No hives or eczema      MEDICATIONS  (STANDING):  aspirin enteric coated 81 milliGRAM(s) Oral daily  cefuroxime  IVPB 1500 milliGRAM(s) IV Intermittent every 8 hours  enoxaparin Injectable 70 milliGRAM(s) SubCutaneous every 12 hours  gabapentin 300 milliGRAM(s) Oral every 8 hours  influenza   Vaccine 0.5 milliLiter(s) IntraMuscular once  lidocaine   4% Patch 1 Patch Transdermal daily  pantoprazole    Tablet 40 milliGRAM(s) Oral daily  senna 2 Tablet(s) Oral at bedtime  sodium chloride 0.9% lock flush 3 milliLiter(s) IV Push every 8 hours  warfarin 5 milliGRAM(s) Oral once    MEDICATIONS  (PRN):  methocarbamol 500 milliGRAM(s) Oral every 8 hours PRN Muscle Spasm  oxyCODONE    IR 5 milliGRAM(s) Oral every 4 hours PRN Moderate Pain (4 - 6)  oxyCODONE    IR 10 milliGRAM(s) Oral every 4 hours PRN Severe Pain (7 - 10)        Allergies  - penicillin (Unknown)    Vital Signs Last 24 Hrs  T(C): 37.1 (03 Dec 2021 09:34), Max: 37.3 (02 Dec 2021 17:14)  T(F): 98.8 (03 Dec 2021 09:34), Max: 99.2 (02 Dec 2021 17:14)  HR: 66 (03 Dec 2021 09:34) (54 - 66)  BP: 99/61 (03 Dec 2021 09:34) (92/58 - 128/82)  BP(mean): --  RR: 18 (03 Dec 2021 09:34) (17 - 18)  SpO2: 98% (03 Dec 2021 09:34) (92% - 98%)    Physical Exam:  Constitutional: AAOx3, NAD  Neck: supple, No JVD  Cardiovascular: +S1S2 RRR. 2/6 BARB. Could not hear clear metallic closing sounds   Pulmonary: , no wheezes, rales. rhonci  Abdomen:  soft NTND  Extremities: no edema b/l,   Neuro: non focal, speech clear, BORDEN x 4    LABS:                        7.8    10.51 )-----------( 127      ( 03 Dec 2021 06:32 )             23.5       139  |  103  |  16.4  ----------------------------<  101<H>  4.1   |  23.0  |  0.53    Ca    8.0<L>      03 Dec 2021 06:32  Mg     2.0                 RADIOLOGY & ADDITIONAL STUDIES:  EKG 12/3/2021: SR, 1st AVB,  ms, and LBBB with  ms  EKG 21 : SR with CHB and J. escape with RBBB, VR at 57 bpm.  ms  EKG 21 :  SR with CHB and J. escape with IVCD, VR at 55 bpm.  ms   EKG 21 16:02: SR with IVCD,  ms,   EKG 2021: SB, normal QRS/IN

## 2021-12-03 NOTE — PROGRESS NOTE ADULT - PROBLEM SELECTOR PLAN 4
Lovenox full dose bridging to Coumadin and SCDs for DVT prophylaxis.  Protonix for GI prophylaxis.  Continue with bowel regimen PRN.     Plan to be discussed / reviewed with CT Surgery attending / team during AM rounds.

## 2021-12-03 NOTE — PROGRESS NOTE ADULT - PROBLEM SELECTOR PLAN 3
Postop CHB with EPWs attached to EPM. Threshold 9. No pacing burden at this time. HR remains 50s and BP remains stable with SBP 100s.   EP following, will try to wait until POD#5 to assess need for PPM given patient's age and intermittent signs of AV conduction on telemetry.

## 2021-12-04 LAB
ANION GAP SERPL CALC-SCNC: 12 MMOL/L — SIGNIFICANT CHANGE UP (ref 5–17)
APTT BLD: 28.8 SEC — SIGNIFICANT CHANGE UP (ref 27.5–35.5)
BUN SERPL-MCNC: 12.4 MG/DL — SIGNIFICANT CHANGE UP (ref 8–20)
CALCIUM SERPL-MCNC: 8.3 MG/DL — LOW (ref 8.6–10.2)
CHLORIDE SERPL-SCNC: 102 MMOL/L — SIGNIFICANT CHANGE UP (ref 98–107)
CO2 SERPL-SCNC: 22 MMOL/L — SIGNIFICANT CHANGE UP (ref 22–29)
CREAT SERPL-MCNC: 0.41 MG/DL — LOW (ref 0.5–1.3)
GLUCOSE SERPL-MCNC: 92 MG/DL — SIGNIFICANT CHANGE UP (ref 70–99)
HCT VFR BLD CALC: 23.5 % — LOW (ref 34.5–45)
HGB BLD-MCNC: 7.6 G/DL — LOW (ref 11.5–15.5)
INR BLD: 1.3 RATIO — HIGH (ref 0.88–1.16)
MAGNESIUM SERPL-MCNC: 1.7 MG/DL — SIGNIFICANT CHANGE UP (ref 1.6–2.6)
MCHC RBC-ENTMCNC: 29.6 PG — SIGNIFICANT CHANGE UP (ref 27–34)
MCHC RBC-ENTMCNC: 32.3 GM/DL — SIGNIFICANT CHANGE UP (ref 32–36)
MCV RBC AUTO: 91.4 FL — SIGNIFICANT CHANGE UP (ref 80–100)
PLATELET # BLD AUTO: 167 K/UL — SIGNIFICANT CHANGE UP (ref 150–400)
POTASSIUM SERPL-MCNC: 3.8 MMOL/L — SIGNIFICANT CHANGE UP (ref 3.5–5.3)
POTASSIUM SERPL-SCNC: 3.8 MMOL/L — SIGNIFICANT CHANGE UP (ref 3.5–5.3)
PROTHROM AB SERPL-ACNC: 14.9 SEC — HIGH (ref 10.6–13.6)
RBC # BLD: 2.57 M/UL — LOW (ref 3.8–5.2)
RBC # FLD: 12.4 % — SIGNIFICANT CHANGE UP (ref 10.3–14.5)
SODIUM SERPL-SCNC: 136 MMOL/L — SIGNIFICANT CHANGE UP (ref 135–145)
WBC # BLD: 8.92 K/UL — SIGNIFICANT CHANGE UP (ref 3.8–10.5)
WBC # FLD AUTO: 8.92 K/UL — SIGNIFICANT CHANGE UP (ref 3.8–10.5)

## 2021-12-04 PROCEDURE — 99232 SBSQ HOSP IP/OBS MODERATE 35: CPT

## 2021-12-04 PROCEDURE — 71045 X-RAY EXAM CHEST 1 VIEW: CPT | Mod: 26

## 2021-12-04 RX ORDER — POTASSIUM CHLORIDE 20 MEQ
40 PACKET (EA) ORAL EVERY 6 HOURS
Refills: 0 | Status: COMPLETED | OUTPATIENT
Start: 2021-12-04 | End: 2021-12-04

## 2021-12-04 RX ORDER — WARFARIN SODIUM 2.5 MG/1
5 TABLET ORAL ONCE
Refills: 0 | Status: COMPLETED | OUTPATIENT
Start: 2021-12-04 | End: 2021-12-04

## 2021-12-04 RX ORDER — MAGNESIUM SULFATE 500 MG/ML
2 VIAL (ML) INJECTION ONCE
Refills: 0 | Status: COMPLETED | OUTPATIENT
Start: 2021-12-04 | End: 2021-12-04

## 2021-12-04 RX ADMIN — Medication 40 MILLIEQUIVALENT(S): at 16:57

## 2021-12-04 RX ADMIN — PANTOPRAZOLE SODIUM 40 MILLIGRAM(S): 20 TABLET, DELAYED RELEASE ORAL at 10:13

## 2021-12-04 RX ADMIN — SENNA PLUS 2 TABLET(S): 8.6 TABLET ORAL at 21:10

## 2021-12-04 RX ADMIN — OXYCODONE HYDROCHLORIDE 5 MILLIGRAM(S): 5 TABLET ORAL at 05:34

## 2021-12-04 RX ADMIN — GABAPENTIN 300 MILLIGRAM(S): 400 CAPSULE ORAL at 05:34

## 2021-12-04 RX ADMIN — OXYCODONE HYDROCHLORIDE 5 MILLIGRAM(S): 5 TABLET ORAL at 17:16

## 2021-12-04 RX ADMIN — OXYCODONE HYDROCHLORIDE 10 MILLIGRAM(S): 5 TABLET ORAL at 22:10

## 2021-12-04 RX ADMIN — SODIUM CHLORIDE 3 MILLILITER(S): 9 INJECTION INTRAMUSCULAR; INTRAVENOUS; SUBCUTANEOUS at 22:10

## 2021-12-04 RX ADMIN — OXYCODONE HYDROCHLORIDE 5 MILLIGRAM(S): 5 TABLET ORAL at 06:10

## 2021-12-04 RX ADMIN — SODIUM CHLORIDE 3 MILLILITER(S): 9 INJECTION INTRAMUSCULAR; INTRAVENOUS; SUBCUTANEOUS at 14:53

## 2021-12-04 RX ADMIN — ENOXAPARIN SODIUM 70 MILLIGRAM(S): 100 INJECTION SUBCUTANEOUS at 21:09

## 2021-12-04 RX ADMIN — GABAPENTIN 300 MILLIGRAM(S): 400 CAPSULE ORAL at 21:10

## 2021-12-04 RX ADMIN — GABAPENTIN 300 MILLIGRAM(S): 400 CAPSULE ORAL at 13:18

## 2021-12-04 RX ADMIN — LIDOCAINE 1 PATCH: 4 CREAM TOPICAL at 07:04

## 2021-12-04 RX ADMIN — Medication 40 MILLIEQUIVALENT(S): at 10:11

## 2021-12-04 RX ADMIN — LIDOCAINE 1 PATCH: 4 CREAM TOPICAL at 07:00

## 2021-12-04 RX ADMIN — POLYETHYLENE GLYCOL 3350 17 GRAM(S): 17 POWDER, FOR SOLUTION ORAL at 16:57

## 2021-12-04 RX ADMIN — OXYCODONE HYDROCHLORIDE 5 MILLIGRAM(S): 5 TABLET ORAL at 16:57

## 2021-12-04 RX ADMIN — WARFARIN SODIUM 5 MILLIGRAM(S): 2.5 TABLET ORAL at 21:10

## 2021-12-04 RX ADMIN — POLYETHYLENE GLYCOL 3350 17 GRAM(S): 17 POWDER, FOR SOLUTION ORAL at 05:34

## 2021-12-04 RX ADMIN — SODIUM CHLORIDE 3 MILLILITER(S): 9 INJECTION INTRAMUSCULAR; INTRAVENOUS; SUBCUTANEOUS at 07:14

## 2021-12-04 RX ADMIN — OXYCODONE HYDROCHLORIDE 5 MILLIGRAM(S): 5 TABLET ORAL at 10:17

## 2021-12-04 RX ADMIN — Medication 50 GRAM(S): at 10:11

## 2021-12-04 RX ADMIN — LIDOCAINE 1 PATCH: 4 CREAM TOPICAL at 21:09

## 2021-12-04 RX ADMIN — OXYCODONE HYDROCHLORIDE 5 MILLIGRAM(S): 5 TABLET ORAL at 10:12

## 2021-12-04 RX ADMIN — Medication 81 MILLIGRAM(S): at 10:12

## 2021-12-04 RX ADMIN — OXYCODONE HYDROCHLORIDE 10 MILLIGRAM(S): 5 TABLET ORAL at 21:10

## 2021-12-04 RX ADMIN — ENOXAPARIN SODIUM 70 MILLIGRAM(S): 100 INJECTION SUBCUTANEOUS at 10:12

## 2021-12-04 NOTE — PROGRESS NOTE ADULT - ASSESSMENT
32 yo F   x2 LMP 10/2021 PMH of Congential Heart Disease, Bicuspid Aortic Valve, Murmur, and Severe Aortic Stenosis. At the age of 10 patient underwent an open aortic valvuloplasty under cardiopulmonary bypass on 2/15/2001. Patient is now experiencing intermittent episodes of near syncope and palpitations. She denies SOB but c/o occasional exertional dyspnea with some chest discomfort. She is a  and walks routinely throughout the day. She quit smoking 1-1/2 months ago. Preop assessment prior to reoperation sternotomy, aortic valve replacement, ascending hemiarch replacement w/Dr Munguia scheduled for 2021    Above from H and P on (2021 15:14)  - Pt reports lossing to follow up for many years till earlier this year when she saw a cardiologist for ZHAO and one presyncope episode. Found to have severe AS, and aortic now s/p mechanical AVR and hemiarch replacement (19 ON-X Valve, 23 hemashield) on 2021.   - Post op she has been in CHB with junctional escape and RBBB, VR in the 50s.   - CTS/CTICU team reported high epicardial wires threshold at 9 today, but she did not have any pacing requirements so far.   - She denies any palpitation, or syncope  - Social hx: remote weed use, ex smoker, stopped 2 months ago. no alcohol or drug abuse  - family hx: 2 kids, one with PV disease. The other has been screened and has no CHD. Mother and maternal GM had BAV  - Tele this morning (in 4 tower) showed occasional conducted beats with 1 st AVB   - she has been started on warfarin with low dose LMWH, INR still sub-theraputic     Interval Hx -12/3:  - Doing well, feeling better, one CT is out.   - Overnight rhythm was sinus with alternating CHB, second degree AVB. Now tele shows SR with 1st AVB and one to one conduction.   - EKG : SR, 1st AVB,  ms and LBBB,  ms.     Interval Hx 12/3-:   - Feeling better, has been ambulating more.   - Overnight rhythm sinus with brief heart block and junctional escape beats.     1. CHB post AVR (2021)  - Currently in sinus rhythm with 1:1 conduction. Episodes of CHB with junctional escape last evening.   - Pacing wires threshold is high (11) but she is not needing it  - Telemetry monitor for another 24-48 hours. If 1:1 conduction persists, can consider atrial pacing at bedside from epicardial wires to further assess AV xiomara conduction if no AVB seen on tele.  - If recurrent high grade AVB, CHB or alternating BBB that persisted by 21 (AVR POD#5), then will plan for Micra AV on Monday afternoon with the hope that she will have late recovery. Will strongly favor avoiding a transvenous PM. A leadless AV PM from [employer] (with actual A and V devices will likely be available to us soon as part of a study) which can be considered in the future if she had persistent CHB and suboptimal results with micra AV.    - Encourage ambulation.     2. BAV and AS, s/p mechanical AVR and timothy-arch as above.      32 yo F   x2 LMP 10/2021 PMH of Congential Heart Disease, Bicuspid Aortic Valve, Murmur, and Severe Aortic Stenosis. At the age of 10 patient underwent an open aortic valvuloplasty under cardiopulmonary bypass on 2/15/2001. Patient is now experiencing intermittent episodes of near syncope and palpitations. She denies SOB but c/o occasional exertional dyspnea with some chest discomfort. She is a  and walks routinely throughout the day. She quit smoking 1-1/2 months ago. Preop assessment prior to reoperation sternotomy, aortic valve replacement, ascending hemiarch replacement w/Dr Munguia scheduled for 2021    Above from H and P on (2021 15:14)  - Pt reports lossing to follow up for many years till earlier this year when she saw a cardiologist for ZHAO and one presyncope episode. Found to have severe AS, and aortic now s/p mechanical AVR and hemiarch replacement (19 ON-X Valve, 23 hemashield) on 2021.   - Post op she has been in CHB with junctional escape and RBBB, VR in the 50s.   - CTS/CTICU team reported high epicardial wires threshold at 9 today, but she did not have any pacing requirements so far.   - She denies any palpitation, or syncope  - Social hx: remote weed use, ex smoker, stopped 2 months ago. no alcohol or drug abuse  - family hx: 2 kids, one with PV disease. The other has been screened and has no CHD. Mother and maternal GM had BAV  - Tele this morning (in 4 tower) showed occasional conducted beats with 1 st AVB   - she has been started on warfarin with low dose LMWH, INR still sub-theraputic     Interval Hx -12/3:  - Doing well, feeling better, one CT is out.   - Overnight rhythm was sinus with brief second degree AVB. Now tele shows SR with 1st AVB and one to one conduction.   - EKG : SR, 1st AVB,  ms and LBBB,  ms.     Interval Hx 12/3-:   - Feeling better, has been ambulating more.   - Overnight rhythm sinus with brief 2nd AV block and Mobitz 1. Now sinus rhythm with 1:1 conduction.      1. CHB post AVR (2021)  - Currently in sinus rhythm with 1:1 conduction. Episodes of CHB with junctional escape last evening.   - Pacing wires threshold is high (11) but she is not needing it  - Telemetry monitor for another 24-48 hours. If 1:1 conduction persists, can consider atrial pacing at bedside from epicardial wires to further assess AV xiomara conduction if no AVB seen on tele.  - If recurrent high grade AVB, CHB or alternating BBB that persisted by 21 (AVR POD#5), then will plan for Micra AV on Monday afternoon with the hope that she will have late recovery. Will strongly favor avoiding a transvenous PM. A leadless AV PM from [employer] (with actual A and V devices will likely be available to us soon as part of a study) which can be considered in the future if she had persistent CHB and suboptimal results with micra AV.    - Encourage ambulation.     2. BAV and AS, s/p mechanical AVR and timothy-arch as above.      30 yo F   x2 LMP 10/2021 PMH of Congential Heart Disease, Bicuspid Aortic Valve, Murmur, and Severe Aortic Stenosis. At the age of 10 patient underwent an open aortic valvuloplasty under cardiopulmonary bypass on 2/15/2001. Patient is now experiencing intermittent episodes of near syncope and palpitations. She denies SOB but c/o occasional exertional dyspnea with some chest discomfort. She is a  and walks routinely throughout the day. She quit smoking 1-1/2 months ago. Preop assessment prior to reoperation sternotomy, aortic valve replacement, ascending hemiarch replacement w/Dr Munguia scheduled for 2021    Above from H and P on (2021 15:14)  - Pt reports lossing to follow up for many years till earlier this year when she saw a cardiologist for ZHAO and one presyncope episode. Found to have severe AS, and aortic now s/p mechanical AVR and hemiarch replacement (19 ON-X Valve, 23 hemashield) on 2021.   - Post op she has been in CHB with junctional escape and RBBB, VR in the 50s.   - CTS/CTICU team reported high epicardial wires threshold at 9 today, but she did not have any pacing requirements so far.   - She denies any palpitation, or syncope  - Social hx: remote weed use, ex smoker, stopped 2 months ago. no alcohol or drug abuse  - family hx: 2 kids, one with PV disease. The other has been screened and has no CHD. Mother and maternal GM had BAV  - Tele this morning (in 4 tower) showed occasional conducted beats with 1 st AVB   - she has been started on warfarin with low dose LMWH, INR still sub-theraputic     Interval Hx -12/3:  - Doing well, feeling better, one CT is out.   - Overnight rhythm was sinus with brief second degree AVB. Now tele shows SR with 1st AVB and one to one conduction.   - EKG : SR, 1st AVB,  ms and LBBB,  ms.     Interval Hx 12/3-:   - Feeling better, has been ambulating more.   - Overnight rhythm sinus with brief 2nd AV block and Mobitz 1. Now sinus rhythm with 1:1 conduction.      1. CHB post AVR (2021)  - Currently in sinus rhythm with 1:1 conduction. Episodes of 2nd degree AV block with junctional escape last evening.   - Pacing wires threshold is high (11) but she is not needing it  - Telemetry monitor for another 24-48 hours. If 1:1 conduction persists, can consider atrial pacing at bedside from epicardial wires to further assess AV xiomara conduction if no AVB seen on tele.  - If recurrent high grade AVB, CHB or alternating BBB that persisted by 21 (AVR POD#5), then will plan for Micra AV on Monday afternoon with the hope that she will have late recovery. Will strongly favor avoiding a transvenous PM. A leadless AV PM from [employer] (with actual A and V devices will likely be available to us soon as part of a study) which can be considered in the future if she had persistent CHB and suboptimal results with micra AV.    - Encourage ambulation.     2. BAV and AS, s/p mechanical AVR and timothy-arch as above.

## 2021-12-04 NOTE — PROGRESS NOTE ADULT - PROBLEM SELECTOR PLAN 1
S/p Redo-sternotomy, aortic valve replacement, ascending hemiarch replacement 11/30  Neurologically intact.  Hemodynamically stable.  Continue aspirin.  Lovenox Bridging to Coumadin in setting of mechanical valve.   Follow up AM INR and dose coumadin daily.   Holding Lopressor in setting of CHB.  Oxygenating well, coughing and deep breathing exercises/incentive spirometry encouraged  D/c PB CT when able as per surgeon if output remains minimal on anticoagulation.    Replete electrolytes PRN.   Continue with PT, increase activity as tolerated.  FS AC+HS with coverage for glycemic control.  Oxy PRN for analgesia.  Plan to be discussed / reviewed with CT Surgery attending / team during AM rounds.

## 2021-12-04 NOTE — PROGRESS NOTE ADULT - SUBJECTIVE AND OBJECTIVE BOX
POD # 4 s/p AVR (#19 On-X mechanical valve) and replacement of ascending aorta (hemiarch with # 24 Gelweave graft)    HPI:  32 yo F   x2 LMP 10/2021 PMH of Congential Heart Disease, Bicuspid Aortic Valve, Murmur, and Severe Aortic Stenosis. At the age of 10 patient underwent an open aortic valvuloplasty under cardiopulmonary bypass on 2/15/2001. Patient is now experiencing intermittent episodes of near syncope and palpitations. She denies SOB but c/o occasional exertional dyspnea with some chest discomfort. She is a  and walks routinely throughout the day. She quit smoking 1-1/2 months ago. Preop assessment prior to reoperation sternotomy, aortic valve replacement, ascending hemiarch replacement w/Dr Munguia scheduled for 2021    Cardiac Summary:  Echocardiogram 10/18/2021: bicuspid aortic valve bicuspid with severe stenosis. Aortic valve area 0.7 cm². Peak and mean gradients are 113 and 55 mmHg respectively. There is mild to moderate aortic valve regurgitation. The mitral valve is normal with minimal regurgitation. Biventricular function is preserved. The aortic root measures 3.5 cm and the ascending aorta measures 4.2. The aortic arch is measured at 3.6 cm.    Cardiac catheterization 10/28/2021: The coronary anatomy is right dominant. The right coronary artery is normal but small, the left-sided circulation shows no disease. The valve was not crossed for the procedure and there were no gradients.    CT angiogram 11/10/2021: The ascending aorta measures 4 cm the level of the main pulmonary artery. The aorta narrows down to 3.2 cm at the sinotubular junction. The aortic sinuses measure 3.0 x 2.6 cm and therefore not dilated. The descending thoracic aorta measures 2.1 cm in maximal diameter. (2021 15:14)    PAST MEDICAL & SURGICAL HISTORY:  Thoracic aortic aneurysm, without rupture  Congenital stenosis of aortic valve  Congenital bicuspid aortic valve  History of open heart surgery, 2001  H/O  section, 2017, 2019  H/O tubal ligation, 2019    FAMILY HISTORY:  FH: diabetes mellitus (Mother)  FH: aortic stenosis (Mother, Grandparent)    Brief Hospital Course: 31 year old female former smoker, ,  x 2, with a medical history of Congential Heart Disease, Bicuspid Aortic Valve, Murmur, Severe Aortic Stenosis s/p open aortic valvuloplasty under cardiopulmonary bypass on 2/15/2001, now with c/o episodes of near syncope, ZHAO, chest discomfort, and palpitations. Patient underwent AVR (#19 On-X mechanical valve) and replacement of ascending aorta (hemiarch with #24 Gelweave graft) on 21 with Dr. Munguia. Postoperative course significant for complete heart block (beta-blocker held, EPWs remain in place, no pacing requirement noted, maintaining adequate BP, EP following).    Significant recent/past 24 hr events: No overnight events reported. Ambulated to the bathroom independently.     Subjective: Patient lying in bed with head elevated in NAD. +Pain controlled. +Passing gas. +Tolerating diet.  Denies fevers, chills, lightheadedness, dizziness, HA, CP, palpitations, SOB, cough, abdominal pain, N/V, diarrhea, numbness/tingling in extremities, or any other acute complaints. ROS negative x 10 systems except as noted above.    MEDICATIONS  (STANDING):  aspirin enteric coated 81 milliGRAM(s) Oral daily  enoxaparin Injectable 70 milliGRAM(s) SubCutaneous every 12 hours  gabapentin 300 milliGRAM(s) Oral every 8 hours  influenza   Vaccine 0.5 milliLiter(s) IntraMuscular once  lidocaine   4% Patch 1 Patch Transdermal daily  pantoprazole    Tablet 40 milliGRAM(s) Oral daily  polyethylene glycol 3350 17 Gram(s) Oral two times a day  senna 2 Tablet(s) Oral at bedtime  sodium chloride 0.9% lock flush 3 milliLiter(s) IV Push every 8 hours    MEDICATIONS  (PRN):  methocarbamol 500 milliGRAM(s) Oral every 8 hours PRN Muscle Spasm  oxyCODONE    IR 5 milliGRAM(s) Oral every 4 hours PRN Moderate Pain (4 - 6)  oxyCODONE    IR 10 milliGRAM(s) Oral every 4 hours PRN Severe Pain (7 - 10)    Allergies:  penicillin (Unknown)    Vitals   T(C): 36.9 (03 Dec 2021 20:45), Max: 37.2 (03 Dec 2021 09:18)  T(F): 98.4 (03 Dec 2021 20:45), Max: 99 (03 Dec 2021 17:00)  HR: 68 (04 Dec 2021 00:00) (54 - 68)  BP: 93/60 (04 Dec 2021 00:00) (93/60 - 128/82)  RR: 18 (04 Dec 2021 00:00) (17 - 18)  SpO2: 97% (04 Dec 2021 00:00) (95% - 99%)    I&O's Detail    02 Dec 2021 07:01  -  03 Dec 2021 07:00  --------------------------------------------------------  IN:    IV PiggyBack: 50 mL    Oral Fluid: 420 mL  Total IN: 470 mL    OUT:    Chest Tube (mL): 86 mL    Chest Tube (mL): 0 mL    Voided (mL): 500 mL  Total OUT: 586 mL    Total NET: -116 mL      03 Dec 2021 07:01  -  04 Dec 2021 05:16  --------------------------------------------------------  IN:    Oral Fluid: 860 mL  Total IN: 860 mL    OUT:    Chest Tube (mL): 14 mL    Voided (mL): 350 mL  Total OUT: 364 mL    Total NET: 496 mL    Physical Exam  Neuro: A+O x 3, non-focal, speech clear and intact  HEENT:  NCAT, No conjuctival edema or icterus, no thrush.    Neck:  Supple, trachea midline  Pulm: +Diminished BSs bilaterally, no accessory muscle use noted  Chest:        mediastinal PB CT with dressing intact and no air leak, no subQ emphysema       +PW (settings: VVI, rate: 40 mA: 20, threshold: 9, sensitivity: 0.8)  CV: milady rate, remains CHB, +BARB  Abd: soft, NT, ND, + BS  Ext: BORDEN x 4, trace LE pitting edema b/l, no cyanosis or clubbing, distal motor/neuro/circ intact  Skin: warm, dry, perfused  Incisions: midsternal mepilex C/D/I, sternum stable    LABS                        7.8    10.51 )-----------( 127      ( 03 Dec 2021 06:32 )             23.5     12    139  |  103  |  16.4  ----------------------------<  101<H>  4.1   |  23.0  |  0.53    Ca    8.0<L>      03 Dec 2021 06:32  Mg     2.0     12    PT/INR - ( 03 Dec 2021 06:32 )   PT: 15.1 sec;   INR: 1.32 ratio      PTT - ( 03 Dec 2021 06:32 )  PTT:32.4 sec    POCT Blood Glucose.: 101 mg/dL (21 @ 08:13)      Last CXR:  < from: Xray Chest 1 View- PORTABLE-Urgent (Xray Chest 1 View- PORTABLE-Urgent .) (21 @ 12:15) >  Heart magnified by technique.Sternotomy, prosthetic heart valve, left chest tube, and mediastinal drain remain.  There are improved central infiltrates compared to .  Follow-up AP erect chest on 2021 at 11:53 AM.  Left chest tube is been removed. No pneumothorax.  IMPRESSION: Uneventful removal of left chest tube. Persistent mild lung findings at the lung bases.  < end of copied text >

## 2021-12-04 NOTE — PROGRESS NOTE ADULT - SUBJECTIVE AND OBJECTIVE BOX
30 yo F   x2 LMP 10/2021 PMH of Congential Heart Disease, Bicuspid Aortic Valve, Murmur, and Severe Aortic Stenosis. At the age of 10 patient underwent an open aortic valvuloplasty under cardiopulmonary bypass on 2/15/2001. Patient is now experiencing intermittent episodes of near syncope and palpitations. She denies SOB but c/o occasional exertional dyspnea with some chest discomfort. She is a  and walks routinely throughout the day. She quit smoking 1-1/2 months ago. Preop assessment prior to reoperation sternotomy, aortic valve replacement, ascending hemiarch replacement w/Dr Munguia scheduled for 2021    Cardiac Summary:  - Echocardiogram 10/18/2021: bicuspid aortic valve bicuspid with severe stenosis. Aortic valve area 0.7 cm². Peak and mean gradients are 113 and 55 mmHg respectively. There is mild to moderate aortic valve regurgitation. The mitral valve is normal with minimal regurgitation. Biventricular function is preserved. The aortic root measures 3.5 cm and the ascending aorta measures 4.2. The aortic arch is measured at 3.6 cm.  - Cardiac catheterization 10/28/2021: The coronary anatomy is right dominant. The right coronary artery is normal but small, the left-sided circulation shows no disease. The valve was not crossed for the procedure and there were no gradients.  - CT angiogram 11/10/2021: The ascending aorta measures 4 cm the level of the main pulmonary artery. The aorta narrows down to 3.2 cm at the sinotubular junction. The aortic sinuses measure 3.0 x 2.6 cm and therefore not dilated. The descending thoracic aorta measures 2.1 cm in maximal diameter.    Above from H and P on (2021 15:14)  - Pt reports losing to follow up for many years till earlier this year when she saw a cardiologist for ZHAO and one presyncope episode. Found to have severe AS, and aortic now s/p mechanical AVR and hemiarch replacement (19 ON-X Valve, 23 hemashield) on 2021.   - Post op she has been in CHB with junctional escape and RBBB, VR in the 50s.   - CTS/CTICU team reported high epicardial wires threshold at 9 today, but she did not have any pacing requirements so far.   - She denies any palpitation, or syncope  - Social hx: remote weed use, ex smoker, stopped 2 months ago. no alcohol or drug abuse  - family hx: 2 kids, one with PV disease. The other has been screened and has no CHD. Mother and maternal GM had BAV  - Tele this morning (in 4 tower) showed occasional conducted beats with 1 st AVB   - she has been started on warfarin with low dose LMWH, INR still sub-theraputic     Interval Hx -12/3:  - Doing well, feeling better, one CT is out.   - Overnight rhythm was sinus with alternating CHB, second degree AVB. Now tele shows SR with 1st AVB and one to one conduction.   - EKG : SR, 1st AVB,  ms and LBBB,  ms.     Interval Hx 12/3-:   - Found sitting in bedside chair, states feeling better.  - Overnight rhythm is sinus rhythm with short episode of CHB and junctional escape. Now NSR with 1:1 conduction.     MEDICATIONS  (STANDING):  aspirin enteric coated 81 milliGRAM(s) Oral daily  enoxaparin Injectable 70 milliGRAM(s) SubCutaneous every 12 hours  gabapentin 300 milliGRAM(s) Oral every 8 hours  influenza   Vaccine 0.5 milliLiter(s) IntraMuscular once  lidocaine   4% Patch 1 Patch Transdermal daily  magnesium sulfate  IVPB 2 Gram(s) IV Intermittent once  pantoprazole    Tablet 40 milliGRAM(s) Oral daily  polyethylene glycol 3350 17 Gram(s) Oral two times a day  potassium chloride   Powder 40 milliEquivalent(s) Oral every 6 hours  senna 2 Tablet(s) Oral at bedtime  sodium chloride 0.9% lock flush 3 milliLiter(s) IV Push every 8 hours  warfarin 5 milliGRAM(s) Oral once    MEDICATIONS  (PRN):  methocarbamol 500 milliGRAM(s) Oral every 8 hours PRN Muscle Spasm  oxyCODONE    IR 5 milliGRAM(s) Oral every 4 hours PRN Moderate Pain (4 - 6)  oxyCODONE    IR 10 milliGRAM(s) Oral every 4 hours PRN Severe Pain (7 - 10)    Allergies:  penicillin (Unknown)    PAST MEDICAL & SURGICAL HISTORY:  Thoracic aortic aneurysm, without rupture  Congenital stenosis of aortic valve  Congenital bicuspid aortic valve  History of open heart surgery   H/O  section 2017, 2019  H/O tubal ligation 2019    Vital Signs Last 24 Hrs  T(C): 37.2 (04 Dec 2021 09:12), Max: 37.2 (03 Dec 2021 17:00)  T(F): 98.9 (04 Dec 2021 09:12), Max: 99 (03 Dec 2021 17:00)  HR: 76 (04 Dec 2021 09:12) (56 - 76)  BP: 97/61 (04 Dec 2021 09:12) (93/60 - 111/62)  RR: 18 (04 Dec 2021 09:12) (18 - 18)  SpO2: 98% (04 Dec 2021 09:12) (95% - 99%)    Physical Exam:  Constitutional: NAD, AAOx3  Cardiovascular: +S1S2 RRR  Pulmonary: CTA b/l, unlabored  GI: soft NTND +BS  Extremities: no pedal edema, +distal pulses b/l  Neuro: non focal, BORDEN x4    LABS:                        7.6    8.92  )-----------( 167      ( 04 Dec 2021 07:26 )             23.5     12-    136  |  102  |  12.4  ----------------------------<  92  3.8   |  22.0  |  0.41<L>    Ca    8.3<L>      04 Dec 2021 07:26  Mg     1.7     12-04    PT/INR - ( 04 Dec 2021 07:26 )   PT: 14.9 sec;   INR: 1.30 ratio       PTT - ( 04 Dec 2021 07:26 )  PTT:28.8 sec    RADIOLOGY & ADDITIONAL TESTS:  EKG 12/3/2021: SR, 1st AVB,  ms, and LBBB with  ms  EKG 21 : SR with CHB and J. escape with RBBB, VR at 57 bpm.  ms  EKG 21 :  SR with CHB and J. escape with IVCD, VR at 55 bpm.  ms   EKG 21 16:02: SR with IVCD,  ms,   EKG 2021: SB, normal QRS/AZ          30 yo F   x2 LMP 10/2021 PMH of Congential Heart Disease, Bicuspid Aortic Valve, Murmur, and Severe Aortic Stenosis. At the age of 10 patient underwent an open aortic valvuloplasty under cardiopulmonary bypass on 2/15/2001. Patient is now experiencing intermittent episodes of near syncope and palpitations. She denies SOB but c/o occasional exertional dyspnea with some chest discomfort. She is a  and walks routinely throughout the day. She quit smoking 1-1/2 months ago. Preop assessment prior to reoperation sternotomy, aortic valve replacement, ascending hemiarch replacement w/Dr Munguia scheduled for 2021    Cardiac Summary:  - Echocardiogram 10/18/2021: bicuspid aortic valve bicuspid with severe stenosis. Aortic valve area 0.7 cm². Peak and mean gradients are 113 and 55 mmHg respectively. There is mild to moderate aortic valve regurgitation. The mitral valve is normal with minimal regurgitation. Biventricular function is preserved. The aortic root measures 3.5 cm and the ascending aorta measures 4.2. The aortic arch is measured at 3.6 cm.  - Cardiac catheterization 10/28/2021: The coronary anatomy is right dominant. The right coronary artery is normal but small, the left-sided circulation shows no disease. The valve was not crossed for the procedure and there were no gradients.  - CT angiogram 11/10/2021: The ascending aorta measures 4 cm the level of the main pulmonary artery. The aorta narrows down to 3.2 cm at the sinotubular junction. The aortic sinuses measure 3.0 x 2.6 cm and therefore not dilated. The descending thoracic aorta measures 2.1 cm in maximal diameter.    Above from H and P on (2021 15:14)  - Pt reports losing to follow up for many years till earlier this year when she saw a cardiologist for ZHAO and one presyncope episode. Found to have severe AS, and aortic now s/p mechanical AVR and hemiarch replacement (19 ON-X Valve, 23 hemashield) on 2021.   - Post op she has been in CHB with junctional escape and RBBB, VR in the 50s.   - CTS/CTICU team reported high epicardial wires threshold at 9 today, but she did not have any pacing requirements so far.   - She denies any palpitation, or syncope  - Social hx: remote weed use, ex smoker, stopped 2 months ago. no alcohol or drug abuse  - family hx: 2 kids, one with PV disease. The other has been screened and has no CHD. Mother and maternal GM had BAV  - Tele this morning (in 4 tower) showed occasional conducted beats with 1 st AVB   - she has been started on warfarin with low dose LMWH, INR still sub-theraputic     Interval Hx -12/3:  - Doing well, feeling better, one CT is out.   - Overnight rhythm was sinus with alternating CHB, second degree AVB. Now tele shows SR with 1st AVB and one to one conduction.   - EKG : SR, 1st AVB,  ms and LBBB,  ms.     Interval Hx 12/3-:   - Found sitting in bedside chair, states feeling better.  - Overnight rhythm is sinus rhythm with brief episode of 2nd AV block and Mobitz 1. Now NSR with 1:1 conduction.     MEDICATIONS  (STANDING):  aspirin enteric coated 81 milliGRAM(s) Oral daily  enoxaparin Injectable 70 milliGRAM(s) SubCutaneous every 12 hours  gabapentin 300 milliGRAM(s) Oral every 8 hours  influenza   Vaccine 0.5 milliLiter(s) IntraMuscular once  lidocaine   4% Patch 1 Patch Transdermal daily  magnesium sulfate  IVPB 2 Gram(s) IV Intermittent once  pantoprazole    Tablet 40 milliGRAM(s) Oral daily  polyethylene glycol 3350 17 Gram(s) Oral two times a day  potassium chloride   Powder 40 milliEquivalent(s) Oral every 6 hours  senna 2 Tablet(s) Oral at bedtime  sodium chloride 0.9% lock flush 3 milliLiter(s) IV Push every 8 hours  warfarin 5 milliGRAM(s) Oral once    MEDICATIONS  (PRN):  methocarbamol 500 milliGRAM(s) Oral every 8 hours PRN Muscle Spasm  oxyCODONE    IR 5 milliGRAM(s) Oral every 4 hours PRN Moderate Pain (4 - 6)  oxyCODONE    IR 10 milliGRAM(s) Oral every 4 hours PRN Severe Pain (7 - 10)    Allergies:  penicillin (Unknown)    PAST MEDICAL & SURGICAL HISTORY:  Thoracic aortic aneurysm, without rupture  Congenital stenosis of aortic valve  Congenital bicuspid aortic valve  History of open heart surgery   H/O  section 2017, 2019  H/O tubal ligation 2019    Vital Signs Last 24 Hrs  T(C): 37.2 (04 Dec 2021 09:12), Max: 37.2 (03 Dec 2021 17:00)  T(F): 98.9 (04 Dec 2021 09:12), Max: 99 (03 Dec 2021 17:00)  HR: 76 (04 Dec 2021 09:12) (56 - 76)  BP: 97/61 (04 Dec 2021 09:12) (93/60 - 111/62)  RR: 18 (04 Dec 2021 09:12) (18 - 18)  SpO2: 98% (04 Dec 2021 09:12) (95% - 99%)    Physical Exam:  Constitutional: NAD, AAOx3  Cardiovascular: +S1S2 RRR  Pulmonary: CTA b/l, unlabored  GI: soft NTND +BS  Extremities: no pedal edema, +distal pulses b/l  Neuro: non focal, BORDEN x4    LABS:                        7.6    8.92  )-----------( 167      ( 04 Dec 2021 07:26 )             23.5     12-    136  |  102  |  12.4  ----------------------------<  92  3.8   |  22.0  |  0.41<L>    Ca    8.3<L>      04 Dec 2021 07:26  Mg     1.7     12-04    PT/INR - ( 04 Dec 2021 07:26 )   PT: 14.9 sec;   INR: 1.30 ratio       PTT - ( 04 Dec 2021 07:26 )  PTT:28.8 sec    RADIOLOGY & ADDITIONAL TESTS:  EKG 12/3/2021: SR, 1st AVB,  ms, and LBBB with  ms  EKG 21 : SR with CHB and J. escape with RBBB, VR at 57 bpm.  ms  EKG 21 :  SR with CHB and J. escape with IVCD, VR at 55 bpm.  ms   EKG 21 16:02: SR with IVCD,  ms,   EKG 2021: SB, normal QRS/MO

## 2021-12-04 NOTE — PROGRESS NOTE ADULT - ATTENDING COMMENTS
Brief period of 2nd degree AV block last evening with junctional escape. No pacing noted. Otherwise AV conduction remains 1:1. Will continue to monitor. If worsening AV conduction is seen will need a permanent pacemaker.

## 2021-12-05 LAB
ANION GAP SERPL CALC-SCNC: 13 MMOL/L — SIGNIFICANT CHANGE UP (ref 5–17)
APTT BLD: 28.3 SEC — SIGNIFICANT CHANGE UP (ref 27.5–35.5)
BUN SERPL-MCNC: 9.4 MG/DL — SIGNIFICANT CHANGE UP (ref 8–20)
CALCIUM SERPL-MCNC: 9.2 MG/DL — SIGNIFICANT CHANGE UP (ref 8.6–10.2)
CHLORIDE SERPL-SCNC: 100 MMOL/L — SIGNIFICANT CHANGE UP (ref 98–107)
CO2 SERPL-SCNC: 20 MMOL/L — LOW (ref 22–29)
CREAT SERPL-MCNC: 0.4 MG/DL — LOW (ref 0.5–1.3)
GLUCOSE SERPL-MCNC: 88 MG/DL — SIGNIFICANT CHANGE UP (ref 70–99)
HCT VFR BLD CALC: 23.8 % — LOW (ref 34.5–45)
HGB BLD-MCNC: 7.6 G/DL — LOW (ref 11.5–15.5)
INR BLD: 1.29 RATIO — HIGH (ref 0.88–1.16)
MAGNESIUM SERPL-MCNC: 2.6 MG/DL — SIGNIFICANT CHANGE UP (ref 1.8–2.6)
MCHC RBC-ENTMCNC: 29.2 PG — SIGNIFICANT CHANGE UP (ref 27–34)
MCHC RBC-ENTMCNC: 31.9 GM/DL — LOW (ref 32–36)
MCV RBC AUTO: 91.5 FL — SIGNIFICANT CHANGE UP (ref 80–100)
PLATELET # BLD AUTO: 214 K/UL — SIGNIFICANT CHANGE UP (ref 150–400)
POTASSIUM SERPL-MCNC: 4.2 MMOL/L — SIGNIFICANT CHANGE UP (ref 3.5–5.3)
POTASSIUM SERPL-SCNC: 4.2 MMOL/L — SIGNIFICANT CHANGE UP (ref 3.5–5.3)
PROTHROM AB SERPL-ACNC: 14.8 SEC — HIGH (ref 10.6–13.6)
RBC # BLD: 2.6 M/UL — LOW (ref 3.8–5.2)
RBC # FLD: 12.5 % — SIGNIFICANT CHANGE UP (ref 10.3–14.5)
SODIUM SERPL-SCNC: 133 MMOL/L — LOW (ref 135–145)
WBC # BLD: 7.72 K/UL — SIGNIFICANT CHANGE UP (ref 3.8–10.5)
WBC # FLD AUTO: 7.72 K/UL — SIGNIFICANT CHANGE UP (ref 3.8–10.5)

## 2021-12-05 PROCEDURE — 99232 SBSQ HOSP IP/OBS MODERATE 35: CPT

## 2021-12-05 PROCEDURE — 71045 X-RAY EXAM CHEST 1 VIEW: CPT | Mod: 26

## 2021-12-05 RX ORDER — WARFARIN SODIUM 2.5 MG/1
7.5 TABLET ORAL ONCE
Refills: 0 | Status: COMPLETED | OUTPATIENT
Start: 2021-12-05 | End: 2021-12-05

## 2021-12-05 RX ORDER — ALBUMIN HUMAN 25 %
250 VIAL (ML) INTRAVENOUS ONCE
Refills: 0 | Status: COMPLETED | OUTPATIENT
Start: 2021-12-05 | End: 2021-12-05

## 2021-12-05 RX ORDER — ALBUMIN HUMAN 25 %
250 VIAL (ML) INTRAVENOUS ONCE
Refills: 0 | Status: DISCONTINUED | OUTPATIENT
Start: 2021-12-05 | End: 2021-12-05

## 2021-12-05 RX ORDER — FUROSEMIDE 40 MG
20 TABLET ORAL ONCE
Refills: 0 | Status: DISCONTINUED | OUTPATIENT
Start: 2021-12-05 | End: 2021-12-05

## 2021-12-05 RX ORDER — MAGNESIUM SULFATE 500 MG/ML
2 VIAL (ML) INJECTION ONCE
Refills: 0 | Status: COMPLETED | OUTPATIENT
Start: 2021-12-05 | End: 2021-12-05

## 2021-12-05 RX ORDER — CALCIUM GLUCONATE 100 MG/ML
2 VIAL (ML) INTRAVENOUS ONCE
Refills: 0 | Status: COMPLETED | OUTPATIENT
Start: 2021-12-05 | End: 2021-12-05

## 2021-12-05 RX ADMIN — OXYCODONE HYDROCHLORIDE 10 MILLIGRAM(S): 5 TABLET ORAL at 13:47

## 2021-12-05 RX ADMIN — Medication 200 GRAM(S): at 05:57

## 2021-12-05 RX ADMIN — OXYCODONE HYDROCHLORIDE 10 MILLIGRAM(S): 5 TABLET ORAL at 21:40

## 2021-12-05 RX ADMIN — Medication 81 MILLIGRAM(S): at 09:12

## 2021-12-05 RX ADMIN — ENOXAPARIN SODIUM 70 MILLIGRAM(S): 100 INJECTION SUBCUTANEOUS at 09:13

## 2021-12-05 RX ADMIN — Medication 50 GRAM(S): at 05:11

## 2021-12-05 RX ADMIN — SODIUM CHLORIDE 3 MILLILITER(S): 9 INJECTION INTRAMUSCULAR; INTRAVENOUS; SUBCUTANEOUS at 05:07

## 2021-12-05 RX ADMIN — GABAPENTIN 300 MILLIGRAM(S): 400 CAPSULE ORAL at 13:47

## 2021-12-05 RX ADMIN — ENOXAPARIN SODIUM 70 MILLIGRAM(S): 100 INJECTION SUBCUTANEOUS at 22:53

## 2021-12-05 RX ADMIN — SENNA PLUS 2 TABLET(S): 8.6 TABLET ORAL at 21:40

## 2021-12-05 RX ADMIN — POLYETHYLENE GLYCOL 3350 17 GRAM(S): 17 POWDER, FOR SOLUTION ORAL at 09:13

## 2021-12-05 RX ADMIN — LIDOCAINE 1 PATCH: 4 CREAM TOPICAL at 07:35

## 2021-12-05 RX ADMIN — POLYETHYLENE GLYCOL 3350 17 GRAM(S): 17 POWDER, FOR SOLUTION ORAL at 22:53

## 2021-12-05 RX ADMIN — Medication 125 MILLILITER(S): at 11:01

## 2021-12-05 RX ADMIN — WARFARIN SODIUM 7.5 MILLIGRAM(S): 2.5 TABLET ORAL at 21:40

## 2021-12-05 RX ADMIN — GABAPENTIN 300 MILLIGRAM(S): 400 CAPSULE ORAL at 21:40

## 2021-12-05 RX ADMIN — LIDOCAINE 1 PATCH: 4 CREAM TOPICAL at 09:18

## 2021-12-05 RX ADMIN — SODIUM CHLORIDE 3 MILLILITER(S): 9 INJECTION INTRAMUSCULAR; INTRAVENOUS; SUBCUTANEOUS at 21:33

## 2021-12-05 RX ADMIN — LIDOCAINE 1 PATCH: 4 CREAM TOPICAL at 22:51

## 2021-12-05 RX ADMIN — PANTOPRAZOLE SODIUM 40 MILLIGRAM(S): 20 TABLET, DELAYED RELEASE ORAL at 09:12

## 2021-12-05 RX ADMIN — SODIUM CHLORIDE 3 MILLILITER(S): 9 INJECTION INTRAMUSCULAR; INTRAVENOUS; SUBCUTANEOUS at 13:32

## 2021-12-05 RX ADMIN — OXYCODONE HYDROCHLORIDE 10 MILLIGRAM(S): 5 TABLET ORAL at 14:20

## 2021-12-05 RX ADMIN — OXYCODONE HYDROCHLORIDE 10 MILLIGRAM(S): 5 TABLET ORAL at 03:06

## 2021-12-05 RX ADMIN — OXYCODONE HYDROCHLORIDE 10 MILLIGRAM(S): 5 TABLET ORAL at 22:15

## 2021-12-05 NOTE — PROGRESS NOTE ADULT - PROBLEM SELECTOR PLAN 3
Postop CHB with EPWs attached to EPM. Threshold 9. No pacing burden at this time. BP remains stable with SBP 90-100s.   Converted to NSR 12/4.   EP following. Ear Star Wedge Flap Text: The defect edges were debeveled with a #15 blade scalpel.  Given the location of the defect and the proximity to free margins (helical rim) an ear star wedge flap was deemed most appropriate.  Using a sterile surgical marker, the appropriate flap was drawn incorporating the defect and placing the expected incisions between the helical rim and antihelix where possible.  The area thus outlined was incised through and through with a #15 scalpel blade.

## 2021-12-05 NOTE — PROGRESS NOTE ADULT - ASSESSMENT
31 year old female former smoker, ,  x 2, with a medical history of Congential Heart Disease, Bicuspid Aortic Valve, Murmur, Severe Aortic Stenosis s/p open aortic valvuloplasty under cardiopulmonary bypass on 2/15/2001, now with c/o episodes of near syncope, ZHAO, chest discomfort, and palpitations. Patient underwent AVR (#19 On-X mechanical valve) and replacement of ascending aorta (hemiarch with #24 Gelweave graft) on 21 with Dr. Munguia. Postoperative course significant for complete heart block (beta-blocker held, EPWs remain in place, no pacing requirement noted, converted back to NSR 12/4, maintaining adequate BP, EP following).

## 2021-12-05 NOTE — PROGRESS NOTE ADULT - ASSESSMENT
31 year old female with a history of severe aortic stenosis/bicuspid aortic valve s/p aortic valvuloplasty (2001), now s/p mechanical AVR and hemiarch replacement on 11/30/21. Post-op course was complicated by complicated heart block requiring pacing via epicardial wires which later improved to a junctional escape with RBBB, and then further improvement to 1:1 AV conduction with LBBB (and first degree AV block).     Reviewed telemetry - continues to conduct 1:1 with a LBBB. No AV block or pacing requirement since yesterday.     Continue to monitor on telemetry  Keep epicardial wires for now  Will likely make final decision on permanent PPM tomorrow

## 2021-12-05 NOTE — PROGRESS NOTE ADULT - PROBLEM SELECTOR PLAN 1
S/p Redo-sternotomy, aortic valve replacement, ascending hemiarch replacement 11/30  Neurologically intact.  Hemodynamically stable.  Continue aspirin.  Lovenox Bridging to Coumadin in setting of mechanical valve.   Follow up AM INR and dose coumadin daily.   Holding Lopressor in setting of CHB.  Oxygenating well, coughing and deep breathing exercises/incentive spirometry encouraged  D/c PB CT when able as per surgeon if output remains minimal on anticoagulation.    Diurese PRN. 20 Lasix given this AM for basilar congestion worsening on CXR.   Replete electrolytes PRN.   Continue with PT, increase activity as tolerated.  Oxy PRN for analgesia.  Plan to be discussed / reviewed with CT Surgery attending / team during AM rounds.

## 2021-12-05 NOTE — PROGRESS NOTE ADULT - SUBJECTIVE AND OBJECTIVE BOX
Subjective: No new complaints. Denies dizziness/lightheadedness.    TELE: Sinus rhythm with 1:1 AV conduction    MEDICATIONS  (STANDING):  aspirin enteric coated 81 milliGRAM(s) Oral daily  enoxaparin Injectable 70 milliGRAM(s) SubCutaneous every 12 hours  gabapentin 300 milliGRAM(s) Oral every 8 hours  influenza   Vaccine 0.5 milliLiter(s) IntraMuscular once  lidocaine   4% Patch 1 Patch Transdermal daily  pantoprazole    Tablet 40 milliGRAM(s) Oral daily  polyethylene glycol 3350 17 Gram(s) Oral two times a day  senna 2 Tablet(s) Oral at bedtime  sodium chloride 0.9% lock flush 3 milliLiter(s) IV Push every 8 hours  warfarin 7.5 milliGRAM(s) Oral once    MEDICATIONS  (PRN):  methocarbamol 500 milliGRAM(s) Oral every 8 hours PRN Muscle Spasm  oxyCODONE    IR 5 milliGRAM(s) Oral every 4 hours PRN Moderate Pain (4 - 6)  oxyCODONE    IR 10 milliGRAM(s) Oral every 4 hours PRN Severe Pain (7 - 10)      Allergies    penicillin (Unknown)    Intolerances    Vital Signs Last 24 Hrs  T(C): 37.2 (05 Dec 2021 09:15), Max: 37.4 (04 Dec 2021 15:35)  T(F): 99 (05 Dec 2021 09:15), Max: 99.3 (04 Dec 2021 15:35)  HR: 78 (05 Dec 2021 13:33) (66 - 78)  BP: 93/55 (05 Dec 2021 13:33) (88/52 - 98/57)  BP(mean): --  RR: 16 (05 Dec 2021 09:15) (16 - 18)  SpO2: 98% (05 Dec 2021 09:15) (95% - 99%)    Physical Exam:  Constitutional: NAD, AAOx3  Cardiovascular: +S1S2 RRR  Pulmonary: CTA b/l, unlabored  GI: soft NTND +BS  Extremities: no pedal edema, +distal pulses b/l  Neuro: non focal, BORDEN x4    LABS:                        7.6    7.72  )-----------( 214      ( 05 Dec 2021 07:20 )             23.8     12-05    133<L>  |  100  |  9.4  ----------------------------<  88  4.2   |  20.0<L>  |  0.40<L>    Ca    9.2      05 Dec 2021 07:20  Mg     2.6     12-05      PT/INR - ( 05 Dec 2021 07:20 )   PT: 14.8 sec;   INR: 1.29 ratio         PTT - ( 05 Dec 2021 07:20 )  PTT:28.3 sec

## 2021-12-05 NOTE — PROGRESS NOTE ADULT - SUBJECTIVE AND OBJECTIVE BOX
POD # 5 s/p AVR (#19 On-X mechanical valve) and replacement of ascending aorta (hemiarch with # 24 Gelweave graft)    HPI:  32 yo F   x2 LMP 10/2021 PMH of Congential Heart Disease, Bicuspid Aortic Valve, Murmur, and Severe Aortic Stenosis. At the age of 10 patient underwent an open aortic valvuloplasty under cardiopulmonary bypass on 2/15/2001. Patient is now experiencing intermittent episodes of near syncope and palpitations. She denies SOB but c/o occasional exertional dyspnea with some chest discomfort. She is a  and walks routinely throughout the day. She quit smoking 1-1/2 months ago. Preop assessment prior to reoperation sternotomy, aortic valve replacement, ascending hemiarch replacement w/Dr Munguia scheduled for 2021    Cardiac Summary:  Echocardiogram 10/18/2021: bicuspid aortic valve bicuspid with severe stenosis. Aortic valve area 0.7 cm². Peak and mean gradients are 113 and 55 mmHg respectively. There is mild to moderate aortic valve regurgitation. The mitral valve is normal with minimal regurgitation. Biventricular function is preserved. The aortic root measures 3.5 cm and the ascending aorta measures 4.2. The aortic arch is measured at 3.6 cm.    Cardiac catheterization 10/28/2021: The coronary anatomy is right dominant. The right coronary artery is normal but small, the left-sided circulation shows no disease. The valve was not crossed for the procedure and there were no gradients.    CT angiogram 11/10/2021: The ascending aorta measures 4 cm the level of the main pulmonary artery. The aorta narrows down to 3.2 cm at the sinotubular junction. The aortic sinuses measure 3.0 x 2.6 cm and therefore not dilated. The descending thoracic aorta measures 2.1 cm in maximal diameter. (2021 15:14)    PAST MEDICAL & SURGICAL HISTORY:  Thoracic aortic aneurysm, without rupture  Congenital stenosis of aortic valve  Congenital bicuspid aortic valve  History of open heart surgery, 2001  H/O  section, 2017, 2019  H/O tubal ligation, 2019    FAMILY HISTORY:  FH: diabetes mellitus (Mother)  FH: aortic stenosis (Mother, Grandparent)    Brief Hospital Course: 31 year old female former smoker, ,  x 2, with a medical history of Congential Heart Disease, Bicuspid Aortic Valve, Murmur, Severe Aortic Stenosis s/p open aortic valvuloplasty under cardiopulmonary bypass on 2/15/2001, now with c/o episodes of near syncope, ZHAO, chest discomfort, and palpitations. Patient underwent AVR (#19 On-X mechanical valve) and replacement of ascending aorta (hemiarch with #24 Gelweave graft) on 21 with Dr. Munguia. Postoperative course significant for complete heart block (beta-blocker held, EPWs remain in place, no pacing requirement noted, converted back to NSR 12/4, maintaining adequate BP, EP following).    Significant recent/past 24 hr events: No overnight events reported.     Subjective: Patient sitting up in bed in NAD. +Pain controlled. +Passing gas. +Tolerating diet.  Denies fevers, chills, lightheadedness, dizziness, HA, CP, palpitations, SOB, cough, abdominal pain, N/V, diarrhea, numbness/tingling in extremities, or any other acute complaints. ROS negative x 10 systems except as noted above.    MEDICATIONS  (STANDING):  aspirin enteric coated 81 milliGRAM(s) Oral daily  enoxaparin Injectable 70 milliGRAM(s) SubCutaneous every 12 hours  furosemide   Injectable 20 milliGRAM(s) IV Push once  gabapentin 300 milliGRAM(s) Oral every 8 hours  influenza   Vaccine 0.5 milliLiter(s) IntraMuscular once  lidocaine   4% Patch 1 Patch Transdermal daily  magnesium sulfate  IVPB 2 Gram(s) IV Intermittent once  pantoprazole    Tablet 40 milliGRAM(s) Oral daily  polyethylene glycol 3350 17 Gram(s) Oral two times a day  senna 2 Tablet(s) Oral at bedtime  sodium chloride 0.9% lock flush 3 milliLiter(s) IV Push every 8 hours    MEDICATIONS  (PRN):  methocarbamol 500 milliGRAM(s) Oral every 8 hours PRN Muscle Spasm  oxyCODONE    IR 5 milliGRAM(s) Oral every 4 hours PRN Moderate Pain (4 - 6)  oxyCODONE    IR 10 milliGRAM(s) Oral every 4 hours PRN Severe Pain (7 - 10)    Allergies: penicillin (Unknown)    Vitals   T(C): 36.6 (04 Dec 2021 20:45), Max: 37.4 (04 Dec 2021 15:35)  T(F): 97.9 (04 Dec 2021 20:45), Max: 99.3 (04 Dec 2021 15:35)  HR: 73 (04 Dec 2021 20:45) (70 - 76)  BP: 94/58 (04 Dec 2021 20:45) (90/55 - 97/61)  RR: 18 (04 Dec 2021 20:45) (18 - 18)  SpO2: 99% (04 Dec 2021 20:45) (95% - 99%)    I&O's Detail    03 Dec 2021 07:01  -  04 Dec 2021 07:00  --------------------------------------------------------  IN:    Oral Fluid: 1100 mL  Total IN: 1100 mL    OUT:    Chest Tube (mL): 39 mL    Voided (mL): 1150 mL  Total OUT: 1189 mL    Total NET: -89 mL      04 Dec 2021 07:01  -  05 Dec 2021 04:00  --------------------------------------------------------  IN:  Total IN: 0 mL    OUT:    Chest Tube (mL): 30 mL    Voided (mL): 1350 mL  Total OUT: 1380 mL    Total NET: -1380 mL    Physical Exam  Neuro: A+O x 3, non-focal, speech clear and intact  HEENT:  NCAT, No conjuctival edema or icterus, no thrush.    Neck:  Supple, trachea midline  Pulm: +Diminished BSs bilaterally, no accessory muscle use noted  Chest:        mediastinal PB CT with dressing intact and no air leak, no subQ emphysema       +PW (settings: VVI, rate: 40 mA: 20, sensitivity: 0.8)  CV: RRR, S1S2, +BARB  Abd: soft, NT, ND, + BS  Ext: BORDEN x 4, trace LE pitting edema b/l, no cyanosis or clubbing, distal motor/neuro/circ intact  Skin: warm, dry, perfused  Incisions: midsternal mepilex C/D/I, sternum stable    LABS                        7.6    8.92  )-----------( 167      ( 04 Dec 2021 07:26 )             23.5     12    136  |  102  |  12.4  ----------------------------<  92  3.8   |  22.0  |  0.41<L>    Ca    8.3<L>      04 Dec 2021 07:26  Mg     1.7     12      PT/INR - ( 04 Dec 2021 07:26 )   PT: 14.9 sec;   INR: 1.30 ratio      PTT - ( 04 Dec 2021 07:26 )  PTT:28.8 sec      Last CXR:  < from: Xray Chest 1 View- PORTABLE-Urgent (Xray Chest 1 View- PORTABLE-Urgent .) (21 @ 06:12) >  FINDINGS:  12/3/2021, 4:27 AM:  Unchanged mediastinal drain. Unchanged mediastinal contours status post aortic valve and ascending aortic replacement, layering pleural effusions and probable bibasilar atelectasis, and trace biapical pneumothoraces.  2021, 5:46 AM:  No significant change. Apices are not fully imaged.  IMPRESSION:  No significant change since 2021.  < end of copied text >

## 2021-12-06 ENCOUNTER — TRANSCRIPTION ENCOUNTER (OUTPATIENT)
Age: 31
End: 2021-12-06

## 2021-12-06 LAB
ALBUMIN SERPL ELPH-MCNC: 3.6 G/DL — SIGNIFICANT CHANGE UP (ref 3.3–5.2)
ALP SERPL-CCNC: 50 U/L — SIGNIFICANT CHANGE UP (ref 40–120)
ALT FLD-CCNC: 28 U/L — SIGNIFICANT CHANGE UP
ANION GAP SERPL CALC-SCNC: 12 MMOL/L — SIGNIFICANT CHANGE UP (ref 5–17)
AST SERPL-CCNC: 18 U/L — SIGNIFICANT CHANGE UP
BILIRUB SERPL-MCNC: 0.5 MG/DL — SIGNIFICANT CHANGE UP (ref 0.4–2)
BUN SERPL-MCNC: 7.2 MG/DL — LOW (ref 8–20)
CALCIUM SERPL-MCNC: 8.8 MG/DL — SIGNIFICANT CHANGE UP (ref 8.6–10.2)
CHLORIDE SERPL-SCNC: 98 MMOL/L — SIGNIFICANT CHANGE UP (ref 98–107)
CO2 SERPL-SCNC: 24 MMOL/L — SIGNIFICANT CHANGE UP (ref 22–29)
CREAT SERPL-MCNC: 0.47 MG/DL — LOW (ref 0.5–1.3)
GLUCOSE SERPL-MCNC: 101 MG/DL — HIGH (ref 70–99)
HCT VFR BLD CALC: 24.6 % — LOW (ref 34.5–45)
HGB BLD-MCNC: 7.8 G/DL — LOW (ref 11.5–15.5)
INR BLD: 1.73 RATIO — HIGH (ref 0.88–1.16)
MAGNESIUM SERPL-MCNC: 1.6 MG/DL — LOW (ref 1.8–2.6)
MCHC RBC-ENTMCNC: 28.8 PG — SIGNIFICANT CHANGE UP (ref 27–34)
MCHC RBC-ENTMCNC: 31.7 GM/DL — LOW (ref 32–36)
MCV RBC AUTO: 90.8 FL — SIGNIFICANT CHANGE UP (ref 80–100)
PLATELET # BLD AUTO: 246 K/UL — SIGNIFICANT CHANGE UP (ref 150–400)
POTASSIUM SERPL-MCNC: 4.2 MMOL/L — SIGNIFICANT CHANGE UP (ref 3.5–5.3)
POTASSIUM SERPL-SCNC: 4.2 MMOL/L — SIGNIFICANT CHANGE UP (ref 3.5–5.3)
PROT SERPL-MCNC: 6.4 G/DL — LOW (ref 6.6–8.7)
PROTHROM AB SERPL-ACNC: 19.5 SEC — HIGH (ref 10.6–13.6)
RBC # BLD: 2.71 M/UL — LOW (ref 3.8–5.2)
RBC # FLD: 12.5 % — SIGNIFICANT CHANGE UP (ref 10.3–14.5)
SODIUM SERPL-SCNC: 134 MMOL/L — LOW (ref 135–145)
WBC # BLD: 7.93 K/UL — SIGNIFICANT CHANGE UP (ref 3.8–10.5)
WBC # FLD AUTO: 7.93 K/UL — SIGNIFICANT CHANGE UP (ref 3.8–10.5)

## 2021-12-06 PROCEDURE — 93010 ELECTROCARDIOGRAM REPORT: CPT

## 2021-12-06 PROCEDURE — 71045 X-RAY EXAM CHEST 1 VIEW: CPT | Mod: 26

## 2021-12-06 PROCEDURE — 99231 SBSQ HOSP IP/OBS SF/LOW 25: CPT

## 2021-12-06 RX ORDER — MAGNESIUM SULFATE 500 MG/ML
2 VIAL (ML) INJECTION ONCE
Refills: 0 | Status: COMPLETED | OUTPATIENT
Start: 2021-12-06 | End: 2021-12-06

## 2021-12-06 RX ORDER — WARFARIN SODIUM 2.5 MG/1
5 TABLET ORAL ONCE
Refills: 0 | Status: COMPLETED | OUTPATIENT
Start: 2021-12-06 | End: 2021-12-06

## 2021-12-06 RX ADMIN — POLYETHYLENE GLYCOL 3350 17 GRAM(S): 17 POWDER, FOR SOLUTION ORAL at 16:36

## 2021-12-06 RX ADMIN — SODIUM CHLORIDE 3 MILLILITER(S): 9 INJECTION INTRAMUSCULAR; INTRAVENOUS; SUBCUTANEOUS at 22:11

## 2021-12-06 RX ADMIN — SODIUM CHLORIDE 3 MILLILITER(S): 9 INJECTION INTRAMUSCULAR; INTRAVENOUS; SUBCUTANEOUS at 05:09

## 2021-12-06 RX ADMIN — ENOXAPARIN SODIUM 70 MILLIGRAM(S): 100 INJECTION SUBCUTANEOUS at 11:01

## 2021-12-06 RX ADMIN — OXYCODONE HYDROCHLORIDE 10 MILLIGRAM(S): 5 TABLET ORAL at 16:36

## 2021-12-06 RX ADMIN — OXYCODONE HYDROCHLORIDE 10 MILLIGRAM(S): 5 TABLET ORAL at 02:07

## 2021-12-06 RX ADMIN — SODIUM CHLORIDE 3 MILLILITER(S): 9 INJECTION INTRAMUSCULAR; INTRAVENOUS; SUBCUTANEOUS at 14:46

## 2021-12-06 RX ADMIN — PANTOPRAZOLE SODIUM 40 MILLIGRAM(S): 20 TABLET, DELAYED RELEASE ORAL at 13:33

## 2021-12-06 RX ADMIN — Medication 50 GRAM(S): at 14:44

## 2021-12-06 RX ADMIN — GABAPENTIN 300 MILLIGRAM(S): 400 CAPSULE ORAL at 13:33

## 2021-12-06 RX ADMIN — LIDOCAINE 1 PATCH: 4 CREAM TOPICAL at 11:33

## 2021-12-06 RX ADMIN — OXYCODONE HYDROCHLORIDE 10 MILLIGRAM(S): 5 TABLET ORAL at 23:00

## 2021-12-06 RX ADMIN — LIDOCAINE 1 PATCH: 4 CREAM TOPICAL at 22:05

## 2021-12-06 RX ADMIN — Medication 81 MILLIGRAM(S): at 13:32

## 2021-12-06 RX ADMIN — OXYCODONE HYDROCHLORIDE 10 MILLIGRAM(S): 5 TABLET ORAL at 22:03

## 2021-12-06 RX ADMIN — OXYCODONE HYDROCHLORIDE 10 MILLIGRAM(S): 5 TABLET ORAL at 11:01

## 2021-12-06 RX ADMIN — POLYETHYLENE GLYCOL 3350 17 GRAM(S): 17 POWDER, FOR SOLUTION ORAL at 05:15

## 2021-12-06 RX ADMIN — LIDOCAINE 1 PATCH: 4 CREAM TOPICAL at 07:33

## 2021-12-06 RX ADMIN — GABAPENTIN 300 MILLIGRAM(S): 400 CAPSULE ORAL at 05:15

## 2021-12-06 RX ADMIN — GABAPENTIN 300 MILLIGRAM(S): 400 CAPSULE ORAL at 22:05

## 2021-12-06 RX ADMIN — ENOXAPARIN SODIUM 70 MILLIGRAM(S): 100 INJECTION SUBCUTANEOUS at 22:04

## 2021-12-06 RX ADMIN — OXYCODONE HYDROCHLORIDE 10 MILLIGRAM(S): 5 TABLET ORAL at 12:26

## 2021-12-06 RX ADMIN — METHOCARBAMOL 500 MILLIGRAM(S): 500 TABLET, FILM COATED ORAL at 11:00

## 2021-12-06 RX ADMIN — WARFARIN SODIUM 5 MILLIGRAM(S): 2.5 TABLET ORAL at 22:04

## 2021-12-06 RX ADMIN — SENNA PLUS 2 TABLET(S): 8.6 TABLET ORAL at 22:04

## 2021-12-06 RX ADMIN — OXYCODONE HYDROCHLORIDE 10 MILLIGRAM(S): 5 TABLET ORAL at 17:00

## 2021-12-06 NOTE — PROGRESS NOTE ADULT - PROBLEM SELECTOR PLAN 1
S/p Redo-sternotomy, aortic valve replacement, ascending hemiarch replacement 11/30  Neurologically intact.  Hemodynamically stable.  Continue aspirin.  Lovenox Bridging to Coumadin in setting of mechanical valve.   Follow up AM INR and dose coumadin daily.   Holding Lopressor in setting of recent CHB.  Oxygenating well, coughing and deep breathing exercises/incentive spirometry encouraged  D/c PB CT when able as per surgeon if output remains minimal once INR therapeutic  Diurese PRN.   Replete electrolytes PRN.   Continue with PT, increase activity as tolerated.  Oxy PRN for analgesia.  Plan to be discussed / reviewed with CT Surgery attending / team during AM rounds.

## 2021-12-06 NOTE — PROGRESS NOTE ADULT - SUBJECTIVE AND OBJECTIVE BOX
POD # 6 s/p re-op AVR (#19 On-X mechanical valve) and replacement of ascending aorta (hemiarch with # 24 Gelweave graft)    Review of Systems: negative x 10 systems except as noted \        Significant/[id number] events:Rhythm improving per EP note. EP considering possible micra-leadless PPM      PAST MEDICAL & SURGICAL HISTORY:  Thoracic aortic aneurysm, without rupture    Congenital stenosis of aortic valve    Congenital bicuspid aortic valve    History of open heart surgery  2001    H/O  section  2017, 2019    H/O tubal ligation  2019          aspirin enteric coated 81 milliGRAM(s) Oral daily  enoxaparin Injectable 70 milliGRAM(s) SubCutaneous every 12 hours  gabapentin 300 milliGRAM(s) Oral every 8 hours  influenza   Vaccine 0.5 milliLiter(s) IntraMuscular once  lidocaine   4% Patch 1 Patch Transdermal daily  methocarbamol 500 milliGRAM(s) Oral every 8 hours PRN  oxyCODONE    IR 5 milliGRAM(s) Oral every 4 hours PRN  oxyCODONE    IR 10 milliGRAM(s) Oral every 4 hours PRN  pantoprazole    Tablet 40 milliGRAM(s) Oral daily  polyethylene glycol 3350 17 Gram(s) Oral two times a day  senna 2 Tablet(s) Oral at bedtime  sodium chloride 0.9% lock flush 3 milliLiter(s) IV Push every 8 hours  MEDICATIONS  (PRN):  methocarbamol 500 milliGRAM(s) Oral every 8 hours PRN Muscle Spasm  oxyCODONE    IR 5 milliGRAM(s) Oral every 4 hours PRN Moderate Pain (4 - 6)  oxyCODONE    IR 10 milliGRAM(s) Oral every 4 hours PRN Severe Pain (7 - 10)      Daily     Daily Weight in k.9 (05 Dec 2021 05:00)                              7.6    7.72  )-----------( 214      ( 05 Dec 2021 07:20 )             23.8   12-05    133<L>  |  100  |  9.4  ----------------------------<  88  4.2   |  20.0<L>  |  0.40<L>    Ca    9.2      05 Dec 2021 07:20  Mg     2.6     12-05        PT/INR - ( 05 Dec 2021 07:20 )   PT: 14.8 sec;   INR: 1.29 ratio         PTT - ( 05 Dec 2021 07:20 )  PTT:28.3 sec      Objective:  T(C): 37 (21 @ 21:35), Max: 37.3 (21 @ 17:00)  HR: 82 (21 @ 21:35) (66 - 82)  BP: 106/65 (21 @ 21:35) (85/48 - 106/65)  RR: 16 (21 @ 21:35) (16 - 18)  SpO2: 95% (21 @ 21:35) (95% - 98%)  Wt(kg): --CAPILLARY BLOOD GLUCOSE      I&O's Summary    04 Dec 2021 07:01  -  05 Dec 2021 07:00  --------------------------------------------------------  IN: 100 mL / OUT: 2035 mL / NET: -1935 mL    05 Dec 2021 07:01  -  06 Dec 2021 02:39  --------------------------------------------------------  IN: 720 mL / OUT: 1245 mL / NET: -525 mL        Imaging:  CXR:  < from: Xray Chest 1 View- PORTABLE-Routine (Xray Chest 1 View- PORTABLE-Routine in AM.) (21 @ 04:30) >  IMPRESSION:  Status post cardiac surgery.  Suspect a RIGHT mild effusion and/or basilar airspace disease. Catheters and/or tubes in place    --- End of Report ---    < end of copied text >

## 2021-12-06 NOTE — PROGRESS NOTE ADULT - ASSESSMENT
30 yo F   x2 LMP 10/2021 PMH of Congential Heart Disease, Bicuspid Aortic Valve, Murmur, and Severe Aortic Stenosis. At the age of 10 patient underwent an open aortic valvuloplasty under cardiopulmonary bypass on 2/15/2001. Patient is now experiencing intermittent episodes of near syncope and palpitations. She denies SOB but c/o occasional exertional dyspnea with some chest discomfort. She is a  and walks routinely throughout the day. She quit smoking 1-1/2 months ago. Preop assessment prior to reoperation sternotomy, aortic valve replacement, ascending hemiarch replacement w/Dr Munguia scheduled for 2021    Above from H and P on (2021 15:14)  - Pt reports lossing to follow up for many years till earlier this year when she saw a cardiologist for ZAHO and one presyncope episode. Found to have severe AS, and aortic now s/p mechanical AVR and hemiarch replacement (19 ON-X Valve, 23 hemashield) on 2021.   - Post op she has been in CHB with junctional escape and RBBB, VR in the 50s.   - CTS/CTICU team reported high epicardial wires threshold at 9 today, but she did not have any pacing requirements so far.   - She denies any palpitation, or syncope  - Social hx: remote weed use, ex smoker, stopped 2 months ago. no alcohol or drug abuse  - family hx: 2 kids, one with PV disease. The other has been screened and has no CHD. Mother and maternal GM had BAV  - Tele this morning (in 4 tower) showed occasional conducted beats with 1 st AVB   - she has been started on warfarin with low dose LMWH, INR still sub-theraputic     Interval Hx -12/3:  - Doing well, feeling better, one CT is out.   - Overnight rhythm was sinus with brief second degree AVB. Now tele shows SR with 1st AVB and one to one conduction.   - EKG : SR, 1st AVB,  ms and LBBB,  ms.     Interval Hx 12/3-:   - Feeling better, has been ambulating more.   - Overnight rhythm sinus with brief 2nd AV block and Mobitz 1. Now sinus rhythm with 1:1 conduction.      Interval Hx -12/6:  Doing well, remains in SR with LBBB and one to one AV conduction. Last episode of second degree AVB was on 12/3. EKG today: SR with normal SC, LBBB and  ms. She is anxious to go home. Attempted atrial pacing but she did not tolerate this due to phrenic stimulation. INR still sub-theraputic     1. CHB post AVR (2021)  - Currently in sinus rhythm with 1:1 conduction. Episodes of 2nd degree AV block was last seen on 12/3/21. Now 1:1 conduction upt ot 105 bpm    - Ok to remove pacer wires today  - No indication for PPM at this stage, will f/u while inpatient and will plan for one week MCOT on discharge.     2. BAV and AS, s/p mechanical AVR and timothy-arch as above. INR is sub-theraputic     above d/w pt and CTS team

## 2021-12-06 NOTE — PROGRESS NOTE ADULT - PROBLEM SELECTOR PLAN 4
Lovenox full dose bridging to Coumadin and SCDs for DVT prophylaxis, valve prophylaxis.  Protonix for GI prophylaxis.  Continue with bowel regimen PRN.     Plan to be discussed / reviewed with CT Surgery attending / team during AM rounds.

## 2021-12-06 NOTE — PROGRESS NOTE ADULT - SUBJECTIVE AND OBJECTIVE BOX
32 yo F   x2 LMP 10/2021 PMH of Congential Heart Disease, Bicuspid Aortic Valve, Murmur, and Severe Aortic Stenosis. At the age of 10 patient underwent an open aortic valvuloplasty under cardiopulmonary bypass on 2/15/2001. Patient is now experiencing intermittent episodes of near syncope and palpitations. She denies SOB but c/o occasional exertional dyspnea with some chest discomfort. She is a  and walks routinely throughout the day. She quit smoking 1-1/2 months ago. Preop assessment prior to reoperation sternotomy, aortic valve replacement, ascending hemiarch replacement w/Dr Munguia scheduled for 2021    Cardiac Summary:  - Echocardiogram 10/18/2021: bicuspid aortic valve bicuspid with severe stenosis. Aortic valve area 0.7 cm². Peak and mean gradients are 113 and 55 mmHg respectively. There is mild to moderate aortic valve regurgitation. The mitral valve is normal with minimal regurgitation. Biventricular function is preserved. The aortic root measures 3.5 cm and the ascending aorta measures 4.2. The aortic arch is measured at 3.6 cm.  - Cardiac catheterization 10/28/2021: The coronary anatomy is right dominant. The right coronary artery is normal but small, the left-sided circulation shows no disease. The valve was not crossed for the procedure and there were no gradients.  - CT angiogram 11/10/2021: The ascending aorta measures 4 cm the level of the main pulmonary artery. The aorta narrows down to 3.2 cm at the sinotubular junction. The aortic sinuses measure 3.0 x 2.6 cm and therefore not dilated. The descending thoracic aorta measures 2.1 cm in maximal diameter.    Above from H and P on (2021 15:14)  - Pt reports losing to follow up for many years till earlier this year when she saw a cardiologist for ZHAO and one presyncope episode. Found to have severe AS, and aortic now s/p mechanical AVR and hemiarch replacement (19 ON-X Valve, 23 hemashield) on 2021.   - Post op she has been in CHB with junctional escape and RBBB, VR in the 50s.   - CTS/CTICU team reported high epicardial wires threshold at 9 today, but she did not have any pacing requirements so far.   - She denies any palpitation, or syncope  - Social hx: remote weed use, ex smoker, stopped 2 months ago. no alcohol or drug abuse  - family hx: 2 kids, one with PV disease. The other has been screened and has no CHD. Mother and maternal GM had BAV  - Tele this morning (in 4 tower) showed occasional conducted beats with 1 st AVB   - she has been started on warfarin with low dose LMWH, INR still sub-theraputic     Interval Hx -12/3:  - Doing well, feeling better, one CT is out.   - Overnight rhythm was sinus with alternating CHB, second degree AVB. Now tele shows SR with 1st AVB and one to one conduction.   - EKG : SR, 1st AVB,  ms and LBBB,  ms.     Interval Hx 12/3-:   - Found sitting in bedside chair, states feeling better.  - Overnight rhythm is sinus rhythm with brief episode of 2nd AV block and Mobitz 1. Now NSR with 1:1 conduction.     Interval Hx -:  Doing well, remains in SR with LBBB and one to one AV conduction. Last episode of second degree AVB was on 12/3. EKG today: SR with normal DE, LBBB and  ms. She is anxious to go home. Attempted atrial pacing but she did not tolerate this due to phrenic stimulation.       MEDICATIONS  (STANDING):  aspirin enteric coated 81 milliGRAM(s) Oral daily  enoxaparin Injectable 70 milliGRAM(s) SubCutaneous every 12 hours  gabapentin 300 milliGRAM(s) Oral every 8 hours  influenza   Vaccine 0.5 milliLiter(s) IntraMuscular once  lidocaine   4% Patch 1 Patch Transdermal daily  magnesium sulfate  IVPB 2 Gram(s) IV Intermittent once  pantoprazole    Tablet 40 milliGRAM(s) Oral daily  polyethylene glycol 3350 17 Gram(s) Oral two times a day  senna 2 Tablet(s) Oral at bedtime  sodium chloride 0.9% lock flush 3 milliLiter(s) IV Push every 8 hours    MEDICATIONS  (PRN):  methocarbamol 500 milliGRAM(s) Oral every 8 hours PRN Muscle Spasm  oxyCODONE    IR 5 milliGRAM(s) Oral every 4 hours PRN Moderate Pain (4 - 6)  oxyCODONE    IR 10 milliGRAM(s) Oral every 4 hours PRN Severe Pain (7 - 10)      Allergies:  penicillin (Unknown)    PAST MEDICAL & SURGICAL HISTORY:  Thoracic aortic aneurysm, without rupture  Congenital stenosis of aortic valve  Congenital bicuspid aortic valve  History of open heart surgery   H/O  section 2017, 2019  H/O tubal ligation 2019    Vital Signs Last 24 Hrs  T(C): 37 (06 Dec 2021 05:12), Max: 37.3 (05 Dec 2021 17:00)  T(F): 98.6 (06 Dec 2021 05:12), Max: 99.1 (05 Dec 2021 17:00)  HR: 69 (06 Dec 2021 08:23) (69 - 82)  BP: 101/64 (06 Dec 2021 08:23) (85/48 - 106/65)  BP(mean): --  RR: 18 (06 Dec 2021 08:23) (16 - 18)  SpO2: 95% (06 Dec 2021 08:23) (95% - 98%)    Physical Exam:  Constitutional: NAD, AAOx3  Cardiovascular: +S1S2 RRR  Pulmonary: CTA b/l, unlabored  GI: soft NTND   Extremities: no pedal edema,   Neuro: non focal, BORDEN x4    LABS:                                        7.8    7.93  )-----------( 246      ( 06 Dec 2021 05:39 )             24.6   12-    134<L>  |  98  |  7.2<L>  ----------------------------<  101<H>  4.2   |  24.0  |  0.47<L>    Ca    8.8      06 Dec 2021 05:39  Mg     1.6     12-    TPro  6.4<L>  /  Alb  3.6  /  TBili  0.5  /  DBili  x   /  AST  18  /  ALT  28  /  AlkPhos  50  12-    PT/INR - ( 06 Dec 2021 10:48 )   PT: 19.5 sec;   INR: 1.73 ratio         PTT - ( 05 Dec 2021 07:20 )  PTT:28.3 sec    RADIOLOGY & ADDITIONAL TESTS:  EKG 2021: SR, normal DE, LBBB  ms  EKG 12/3/2021: SR, 1st AVB,  ms, and LBBB with  ms  EKG 21 : SR with CHB and J. escape with RBBB, VR at 57 bpm.  ms  EKG 21 :  SR with CHB and J. escape with IVCD, VR at 55 bpm.  ms   EKG 21 16:02: SR with IVCD,  ms,   EKG 2021: SB, normal QRS/DE

## 2021-12-06 NOTE — PROGRESS NOTE ADULT - PROBLEM SELECTOR PLAN 3
Postop CHB with EPWs attached to EPM. Threshold 9. No pacing burden at this time. BP remains stable with SBP 90-100s.   Converted to NSR 12/4.   As of 12/5 per EP note, now conducting 1:1 with LBBB, and first degree block which is an improvement  EP to decide if patient candidate for micra-leadless PPM  EP following.

## 2021-12-07 LAB
ANION GAP SERPL CALC-SCNC: 12 MMOL/L — SIGNIFICANT CHANGE UP (ref 5–17)
BUN SERPL-MCNC: 6.9 MG/DL — LOW (ref 8–20)
CALCIUM SERPL-MCNC: 8.4 MG/DL — LOW (ref 8.6–10.2)
CHLORIDE SERPL-SCNC: 100 MMOL/L — SIGNIFICANT CHANGE UP (ref 98–107)
CO2 SERPL-SCNC: 24 MMOL/L — SIGNIFICANT CHANGE UP (ref 22–29)
CREAT SERPL-MCNC: 0.46 MG/DL — LOW (ref 0.5–1.3)
GLUCOSE SERPL-MCNC: 98 MG/DL — SIGNIFICANT CHANGE UP (ref 70–99)
HCT VFR BLD CALC: 25.3 % — LOW (ref 34.5–45)
HGB BLD-MCNC: 8.2 G/DL — LOW (ref 11.5–15.5)
INR BLD: 1.86 RATIO — HIGH (ref 0.88–1.16)
MAGNESIUM SERPL-MCNC: 1.9 MG/DL — SIGNIFICANT CHANGE UP (ref 1.6–2.6)
MCHC RBC-ENTMCNC: 29.6 PG — SIGNIFICANT CHANGE UP (ref 27–34)
MCHC RBC-ENTMCNC: 32.4 GM/DL — SIGNIFICANT CHANGE UP (ref 32–36)
MCV RBC AUTO: 91.3 FL — SIGNIFICANT CHANGE UP (ref 80–100)
PLATELET # BLD AUTO: 276 K/UL — SIGNIFICANT CHANGE UP (ref 150–400)
POTASSIUM SERPL-MCNC: 4.1 MMOL/L — SIGNIFICANT CHANGE UP (ref 3.5–5.3)
POTASSIUM SERPL-SCNC: 4.1 MMOL/L — SIGNIFICANT CHANGE UP (ref 3.5–5.3)
PROTHROM AB SERPL-ACNC: 20.9 SEC — HIGH (ref 10.6–13.6)
RBC # BLD: 2.77 M/UL — LOW (ref 3.8–5.2)
RBC # FLD: 12.2 % — SIGNIFICANT CHANGE UP (ref 10.3–14.5)
SODIUM SERPL-SCNC: 136 MMOL/L — SIGNIFICANT CHANGE UP (ref 135–145)
WBC # BLD: 8.11 K/UL — SIGNIFICANT CHANGE UP (ref 3.8–10.5)
WBC # FLD AUTO: 8.11 K/UL — SIGNIFICANT CHANGE UP (ref 3.8–10.5)

## 2021-12-07 RX ORDER — POTASSIUM CHLORIDE 20 MEQ
40 PACKET (EA) ORAL ONCE
Refills: 0 | Status: COMPLETED | OUTPATIENT
Start: 2021-12-07 | End: 2021-12-07

## 2021-12-07 RX ORDER — ACETAMINOPHEN 500 MG
650 TABLET ORAL ONCE
Refills: 0 | Status: COMPLETED | OUTPATIENT
Start: 2021-12-07 | End: 2021-12-07

## 2021-12-07 RX ORDER — MAGNESIUM SULFATE 500 MG/ML
2 VIAL (ML) INJECTION ONCE
Refills: 0 | Status: COMPLETED | OUTPATIENT
Start: 2021-12-07 | End: 2021-12-07

## 2021-12-07 RX ORDER — WARFARIN SODIUM 2.5 MG/1
7.5 TABLET ORAL ONCE
Refills: 0 | Status: COMPLETED | OUTPATIENT
Start: 2021-12-07 | End: 2021-12-07

## 2021-12-07 RX ORDER — SORBITOL SOLUTION 70 %
30 SOLUTION, ORAL MISCELLANEOUS ONCE
Refills: 0 | Status: DISCONTINUED | OUTPATIENT
Start: 2021-12-07 | End: 2021-12-09

## 2021-12-07 RX ADMIN — Medication 40 MILLIEQUIVALENT(S): at 08:07

## 2021-12-07 RX ADMIN — SODIUM CHLORIDE 3 MILLILITER(S): 9 INJECTION INTRAMUSCULAR; INTRAVENOUS; SUBCUTANEOUS at 13:07

## 2021-12-07 RX ADMIN — SODIUM CHLORIDE 3 MILLILITER(S): 9 INJECTION INTRAMUSCULAR; INTRAVENOUS; SUBCUTANEOUS at 22:35

## 2021-12-07 RX ADMIN — ENOXAPARIN SODIUM 70 MILLIGRAM(S): 100 INJECTION SUBCUTANEOUS at 21:22

## 2021-12-07 RX ADMIN — GABAPENTIN 300 MILLIGRAM(S): 400 CAPSULE ORAL at 21:14

## 2021-12-07 RX ADMIN — LIDOCAINE 1 PATCH: 4 CREAM TOPICAL at 21:23

## 2021-12-07 RX ADMIN — OXYCODONE HYDROCHLORIDE 5 MILLIGRAM(S): 5 TABLET ORAL at 12:10

## 2021-12-07 RX ADMIN — GABAPENTIN 300 MILLIGRAM(S): 400 CAPSULE ORAL at 17:55

## 2021-12-07 RX ADMIN — ENOXAPARIN SODIUM 70 MILLIGRAM(S): 100 INJECTION SUBCUTANEOUS at 11:04

## 2021-12-07 RX ADMIN — PANTOPRAZOLE SODIUM 40 MILLIGRAM(S): 20 TABLET, DELAYED RELEASE ORAL at 08:07

## 2021-12-07 RX ADMIN — GABAPENTIN 300 MILLIGRAM(S): 400 CAPSULE ORAL at 06:47

## 2021-12-07 RX ADMIN — LIDOCAINE 1 PATCH: 4 CREAM TOPICAL at 10:47

## 2021-12-07 RX ADMIN — Medication 81 MILLIGRAM(S): at 08:08

## 2021-12-07 RX ADMIN — OXYCODONE HYDROCHLORIDE 5 MILLIGRAM(S): 5 TABLET ORAL at 22:27

## 2021-12-07 RX ADMIN — Medication 650 MILLIGRAM(S): at 21:27

## 2021-12-07 RX ADMIN — LIDOCAINE 1 PATCH: 4 CREAM TOPICAL at 08:11

## 2021-12-07 RX ADMIN — WARFARIN SODIUM 7.5 MILLIGRAM(S): 2.5 TABLET ORAL at 21:15

## 2021-12-07 RX ADMIN — OXYCODONE HYDROCHLORIDE 10 MILLIGRAM(S): 5 TABLET ORAL at 15:31

## 2021-12-07 RX ADMIN — OXYCODONE HYDROCHLORIDE 5 MILLIGRAM(S): 5 TABLET ORAL at 11:13

## 2021-12-07 RX ADMIN — SODIUM CHLORIDE 3 MILLILITER(S): 9 INJECTION INTRAMUSCULAR; INTRAVENOUS; SUBCUTANEOUS at 06:45

## 2021-12-07 RX ADMIN — POLYETHYLENE GLYCOL 3350 17 GRAM(S): 17 POWDER, FOR SOLUTION ORAL at 06:47

## 2021-12-07 RX ADMIN — SENNA PLUS 2 TABLET(S): 8.6 TABLET ORAL at 21:21

## 2021-12-07 RX ADMIN — OXYCODONE HYDROCHLORIDE 5 MILLIGRAM(S): 5 TABLET ORAL at 23:15

## 2021-12-07 RX ADMIN — Medication 650 MILLIGRAM(S): at 22:00

## 2021-12-07 RX ADMIN — Medication 50 GRAM(S): at 08:56

## 2021-12-07 NOTE — DISCHARGE NOTE PROVIDER - NSDCFUADDINST_GEN_ALL_CORE_FT
Please call the Cardiothoracic Surgery office at [phone] if you are experiencing any shortness of breath, chest pain, fevers or chills, drainage from the incisions, persistent nausea, vomiting or if you have any questions about your medications. If the symptoms are severe, call 911 and go to the nearest hospital. You can also call [phone] for an emergency [facility] ambulance, which will take you to the closest [facility].    If you need any assistance for making any appointments for a new consult or referral in any specialty, please call our [facility] Clinical Coordination Center at [phone].

## 2021-12-07 NOTE — DISCHARGE NOTE PROVIDER - CARE PROVIDERS DIRECT ADDRESSES
,DirectAddress_Unknown,[email or web address],DirectAddress_Unknown

## 2021-12-07 NOTE — PROGRESS NOTE ADULT - PROBLEM SELECTOR PLAN 1
S/p Redo-sternotomy, aortic valve replacement, ascending hemiarch replacement 11/30  Neurologically intact, oxy, lido patch, gabapentin for pain  Hemodynamically stable.  Continue aspirin.  Lovenox Bridging to Coumadin in setting of mechanical valve. INR goal ~2 per Dr. Munguia  Follow up AM INR and dose coumadin daily.   Holding Lopressor in setting of recent CHB.  Oxygenating well, coughing and deep breathing exercises/incentive spirometry encouraged  D/c PB CT when able as per surgeon if output remains minimal once INR therapeutic  Diurese PRN.   Replete electrolytes PRN.   Continue with PT, increase activity as tolerated.  Oxy PRN for analgesia.  Plan to be discussed / reviewed with CT Surgery attending / team during AM rounds. S/p Redo-sternotomy, aortic valve replacement, ascending hemiarch replacement 11/30  Neurologically intact, oxy, lido patch, gabapentin for pain  Hemodynamically stable.  Continue aspirin.  Lovenox Bridging to Coumadin in setting of mechanical valve. INR goal ~2 per Dr. Munguia  Follow up AM INR and dose coumadin daily.   Holding Lopressor in setting of recent CHB.  Oxygenating well, coughing and deep breathing exercises/incentive spirometry encouraged  D/c PB CT when able as per surgeon if output remains minimal once INR therapeutic  Diurese PRN.   Replete electrolytes PRN.   Continue with PT, increase activity as tolerated.  Oxy PRN for analgesia.  home pending therapeutic INR and removal of CT  Plan to be discussed / reviewed with CT Surgery attending / team during AM rounds.

## 2021-12-07 NOTE — DIETITIAN INITIAL EVALUATION ADULT. - PERTINENT LABORATORY DATA
12-07 Na136 mmol/L Glu 98 mg/dL K+ 4.1 mmol/L Cr  0.46 mg/dL<L> BUN 6.9 mg/dL<L> Phos n/a   Alb n/a   PAB n/a

## 2021-12-07 NOTE — DISCHARGE NOTE PROVIDER - NSDCCPCAREPLAN_GEN_ALL_CORE_FT
PRINCIPAL DISCHARGE DIAGNOSIS  Diagnosis: Aortic stenosis  Assessment and Plan of Treatment: status post mechanical aortic valve replacement  1. Take ALL of your medications as ordered. Fill your prescriptions the day you are discharged and take according to the schedule you were given. Continue to take a stool softener if you are taking narcotic pain medications. AVOID medications such as ibuprofen or naproxen if you have had bypass surgery. If you have any questions or are unable to fill the prescriptions, please call the office right away at [phone].  2. Shower daily. Clean all incisions daily while showering with warm water and mild soap, pat dry with a clean towel and do not cover with any dressings unless instructed to. No bathing, swimming in a pool or the ocean until instructed by MD.  DO NOT use creams or lotions on the wound.  3. We advise that you do not drive until instructed by MD.   4. You may not return to work until instructed by MD.   5. Please eat a low fat, low cholesterol, low salt diet. (No added/extra salt)  6. Weigh yourself every day in the morning and record it in the weight log in your red folder. Notify the office of any weight gain more than 2-3 pounds in 24 hours.  7. Continue breathing exercises several times a day. Continue to use your heart pillow.  8. No heavy lifting nothing greater than 5 pounds until cleared by MD.   9. Call / Notify MD any fever greater than 101.0, any drainage from incisions or if they become red, hot or very tender to the touch.  10. Increase activity as tolerated. Walk indoors and/or outdoors at least 3 times a day.   your INR levels will be drawn on Mon/Thurs and faxed to Dr. Yao for monitoring.      SECONDARY DISCHARGE DIAGNOSES  Diagnosis: Complete heart block  Assessment and Plan of Treatment: resolved  you had an abnormal heart rhythm that is common after valve surgery  your heart rhythm has improved before time of discharge  follow up with electrophysiology team for a heart monitor device (MCOT) to wear for an additional week     PRINCIPAL DISCHARGE DIAGNOSIS  Diagnosis: Aortic stenosis  Assessment and Plan of Treatment: status post mechanical aortic valve replacement  1. Take ALL of your medications as ordered. Fill your prescriptions the day you are discharged and take according to the schedule you were given. Continue to take a stool softener if you are taking narcotic pain medications. AVOID medications such as ibuprofen or naproxen if you have had bypass surgery. If you have any questions or are unable to fill the prescriptions, please call the office right away at [phone].  2. Shower daily. Clean all incisions daily while showering with warm water and mild soap, pat dry with a clean towel and do not cover with any dressings unless instructed to. No bathing, swimming in a pool or the ocean until instructed by MD.  DO NOT use creams or lotions on the wound.  3. We advise that you do not drive until instructed by MD.   4. You may not return to work until instructed by MD.   5. Please eat a low fat, low cholesterol, low salt diet. (No added/extra salt)  6. Weigh yourself every day in the morning and record it in the weight log in your red folder. Notify the office of any weight gain more than 2-3 pounds in 24 hours.  7. Continue breathing exercises several times a day. Continue to use your heart pillow.  8. No heavy lifting nothing greater than 5 pounds until cleared by MD.   9. Call / Notify MD any fever greater than 101.0, any drainage from incisions or if they become red, hot or very tender to the touch.  10. Increase activity as tolerated. Walk indoors and/or outdoors at least 3 times a day.   your INR levels will be drawn on Mon/Thurs and faxed to Dr. Yao for monitoring.      SECONDARY DISCHARGE DIAGNOSES  Diagnosis: Complete heart block  Assessment and Plan of Treatment: resolved  you had an abnormal heart rhythm that is common after valve surgery  your heart rhythm has improved before time of discharge  please go to Dr. Lucero's office today after discharge to have a heart monitor placed on you (MCOT) to wear for an additional week

## 2021-12-07 NOTE — DISCHARGE NOTE PROVIDER - CARE PROVIDER_API CALL
Adams Munguia; SHOSHANA)  Surgery; Thoracic and Cardiac Surgery  [address] [place] [address]  Phone: [phone]  Fax: [phone]  Follow Up Time:     Raven Yao)  Cardiology  [address] [place] [address]  Phone: [phone]  Fax: [phone]  Follow Up Time:     Yuriy Lucero)  Cardiac Electrophysiology; Cardiovascular Disease; Internal Medicine  [address] [place] [address]  Phone: [phone]  Fax: [phone]  Follow Up Time:    Adams Munguia; SHOSHANA)  Surgery; Thoracic and Cardiac Surgery  [address] [place] [address]  Phone: [phone]  Fax: [phone]  Scheduled Appointment: 12/22/2021 01:45 PM    Raven Yao)  Cardiology  [address] [place] [address]  Phone: [phone]  Fax: [phone]  Follow Up Time:     Yuriy Lucero)  Cardiac Electrophysiology; Cardiovascular Disease; Internal Medicine  [address] [place] [address]  Phone: [phone]  Fax: [phone]  Follow Up Time:

## 2021-12-07 NOTE — DISCHARGE NOTE PROVIDER - REASON FOR ADMISSION
Congenital heart disease w. bicuspid aortic valve Congenital heart disease with bicuspid aortic valve

## 2021-12-07 NOTE — DIETITIAN INITIAL EVALUATION ADULT. - OTHER INFO
31F, former smoker, ,  x 2, with a medical history of Congential Heart Disease, Bicuspid Aortic Valve, Murmur, Severe Aortic Stenosis s/p open aortic valvuloplasty under cardiopulmonary bypass on 2/15/2001, now with c/o episodes of near syncope, ZHAO, chest discomfort, and palpitations, s/p AVR (#19 On-X mechanical valve) and replacement of ascending aorta (hemiarch with #24 Gelweave graft) on 21 with Dr. Munguia. Postoperative course significant for complete heart block (beta-blocker held, EPWs remain in place, no pacing requirement noted, converted back to NSR 12/4, maintaining adequate BP, EP following). Pt reported good po intake PTA and currently. Denied recent wt changes. Provided written DM/heart healthy nutrition education. Pt in pain during assessment, will follow up with more in depth verbal education. Noted with constipation. RD to remain available.

## 2021-12-07 NOTE — DISCHARGE NOTE PROVIDER - NSDCFUADDAPPT_GEN_ALL_CORE_FT
1. Follow up with Dr. Munguia on ------   Please arrive 45 mins early on the day of your appointment to have a chest xray (script is in your folder) taken on the first floor in radiology PRIOR to going to the CT surgery office.     2. Follow up with cardiology in 1-2 weeks. 1. Follow up with Dr. Munguia on 12/22/21 1:45pm  The cardiac surgery office is located on the 1st floor [address], [place].   Please call the CT Surgery office upon arrival to your appointment, you will then be instructed when to enter the building. This is being done due to COVID precautions.     Please arrive 45 mins early on the day of your appointment to have a chest xray (script is in your folder) taken on the first floor in radiology PRIOR to going to the CT surgery office.     2. Follow up with cardiology in 1-2 weeks. 1. Follow up with Dr. Munguia on 12/22/21 1:45pm  The cardiac surgery office is located on the 1st floor [address] [place], [place].   Please call the CT Surgery office upon arrival to your appointment, you will then be instructed when to enter the building. This is being done due to COVID precautions.     Please arrive 45 mins early on the day of your appointment to have a chest xray (script is in your folder) taken on the first floor in radiology PRIOR to going to the CT surgery office.     2. Follow up with cardiology in 1-2 weeks.  3. Follow up with EP Dr. Lucero on 12/20/21, his office to arrange appointment time.

## 2021-12-07 NOTE — DISCHARGE NOTE PROVIDER - HOSPITAL COURSE
31F, former smoker, ,  x 2, with a medical history of Congential Heart Disease, Bicuspid Aortic Valve, Murmur, Severe Aortic Stenosis s/p open aortic valvuloplasty under cardiopulmonary bypass on 2/15/2001, now with c/o episodes of near syncope, ZHAO, chest discomfort, and palpitations, s/p AVR (#19 On-X mechanical valve) and replacement of ascending aorta (hemiarch with #24 Gelweave graft) on 21 with Dr. Munguia. Postoperative course significant for complete heart block, resolved, PW cut at skin on discharge. pt to follow up with EP for MCOT x 1 week on discharge. Pt remains hemodynamically stable, ambulating well, and is stable for discharge as per Dr. Munguia.  d/c'd home on coumadin for her mechanical valve. pt will have INR checks M/Th to be faxed to her cardiology Dr. Yao for monitoring. 31F, former smoker, ,  x 2, with a medical history of Congential Heart Disease, Bicuspid Aortic Valve, Murmur, Severe Aortic Stenosis s/p open aortic valvuloplasty under cardiopulmonary bypass on 2/15/2001, now with c/o episodes of near syncope, ZHAO, chest discomfort, and palpitations, s/p AVR (#19 On-X mechanical valve) and replacement of ascending aorta (hemiarch with #24 Gelweave graft) on 21 with Dr. Munguia. Postoperative course significant for complete heart block, resolved, PW cut at skin on discharge. pt to follow up with EP for MCOT x 1 week on discharge.  On day of discharge H/H noted to be 6.9/21.7, orthostatics negative, patient supplemented with 1 dose of epogen per Dr. Munguia and iron/folic acid/vitC. Echo ___.   Pt remains hemodynamically stable, ambulating well, and is stable for discharge as per Dr. Munguia.  d/c'd home on coumadin for her mechanical valve. pt will have INR checks M/Th to be faxed to her cardiology Dr. Yao for monitoring. 31F, former smoker, ,  x 2, with a medical history of Congential Heart Disease, Bicuspid Aortic Valve, Murmur, Severe Aortic Stenosis s/p open aortic valvuloplasty under cardiopulmonary bypass on 2/15/2001, now with c/o episodes of near syncope, ZHAO, chest discomfort, and palpitations, s/p AVR (#19 On-X mechanical valve) and replacement of ascending aorta (hemiarch with #24 Gelweave graft) on 21 with Dr. Munguia. Postoperative course significant for complete heart block, resolved, PW cut at skin on discharge. pt to follow up with EP for MCOT x 1 week on discharge.  On day of discharge H/H noted to be 6.9/21.7, orthostatics negative, patient supplemented with 1 dose of epogen per Dr. Munguia and iron/folic acid/vitC. Echo ___.   Pt remains hemodynamically stable, ambulating well, and is stable for discharge as per Dr. Munguia.  d/c'd home on coumadin for her mechanical valve. pt will have INR checks M/ to be faxed to her cardiology Dr. Yao for monitoring.     pt to go to Dr. Lucero's office after discharge to have MCOT placed same day. She will f/u with EP on  (their office to arrange). 31F, former smoker, ,  x 2, with a medical history of Congential Heart Disease, Bicuspid Aortic Valve, Murmur, Severe Aortic Stenosis s/p open aortic valvuloplasty under cardiopulmonary bypass on 2/15/2001, now with c/o episodes of near syncope, ZHAO, chest discomfort, and palpitations, s/p AVR (#19 On-X mechanical valve) and replacement of ascending aorta (hemiarch with #24 Gelweave graft) on 21 with Dr. Munguia. Postoperative course significant for complete heart block, resolved, PW cut at skin on discharge. pt to follow up with EP for MCOT x 1 week on discharge.  On day of discharge H/H noted to be 6.9/21.7, orthostatics negative, patient supplemented with 1 dose of epogen per Dr. Munguia and iron/folic acid/vitC. Echo per Dr. Munguia with trivial effusion and trivial PVL.   Pt remains hemodynamically stable, ambulating well, and is stable for discharge as per Dr. Munguia.  d/c'd home on coumadin for her mechanical valve. pt will have INR checks M/Th to be faxed to her cardiology Dr. Yao for monitoring.     pt to go to Dr. Lucero's office after discharge to have MCOT placed same day. She will f/u with EP on  (their office to arrange).

## 2021-12-07 NOTE — DISCHARGE NOTE PROVIDER - NSDCMRMEDTOKEN_GEN_ALL_CORE_FT
ascorbic acid 500 mg oral tablet: 1 tab(s) orally once a day  aspirin 81 mg oral delayed release tablet: 1 tab(s) orally once a day  ferrous sulfate 325 mg (65 mg elemental iron) oral tablet: 1 tab(s) orally once a day  folic acid 1 mg oral tablet: 1 tab(s) orally once a day  oxyCODONE 5 mg oral tablet: 1 tab(s) orally every 6 hours MDD:4   senna oral tablet: 2 tab(s) orally once a day (at bedtime), As Needed -for constipation   warfarin 10 mg oral tablet: 1 tab(s) orally once (at bedtime)

## 2021-12-07 NOTE — PROGRESS NOTE ADULT - ASSESSMENT
31F, former smoker, ,  x 2, with a medical history of Congential Heart Disease, Bicuspid Aortic Valve, Murmur, Severe Aortic Stenosis s/p open aortic valvuloplasty under cardiopulmonary bypass on 2/15/2001, now with c/o episodes of near syncope, ZHAO, chest discomfort, and palpitations, s/p AVR (#19 On-X mechanical valve) and replacement of ascending aorta (hemiarch with #24 Gelweave graft) on 21 with Dr. Munguia. Postoperative course significant for complete heart block (beta-blocker held, EPWs remain in place, no pacing requirement noted, converted back to NSR 12/4, maintaining adequate BP, EP following).

## 2021-12-07 NOTE — PROGRESS NOTE ADULT - PROBLEM SELECTOR PLAN 4
Lovenox full dose bridging to Coumadin and SCDs for DVT prophylaxis, valve prophylaxis.  Protonix for GI prophylaxis.  Continue with bowel regimen PRN.     Plan to be discussed / reviewed with CT Surgery attending / team during AM rounds. Lovenox full dose bridging to Coumadin and SCDs for DVT prophylaxis, valve prophylaxis.  Protonix for GI prophylaxis.  Continue with bowel regimen PRN.

## 2021-12-07 NOTE — DISCHARGE NOTE PROVIDER - PROVIDER TOKENS
PROVIDER:[TOKEN:[[id number]]],PROVIDER:[TOKEN:[[id number]]],PROVIDER:[TOKEN:[[id number]]] PROVIDER:[TOKEN:[[id number]],SCHEDULEDAPPT:[12/22/2021],SCHEDULEDAPPTTIME:[01:45 PM]],PROVIDER:[TOKEN:[[id number]]],PROVIDER:[TOKEN:[[id number]]]

## 2021-12-07 NOTE — PROGRESS NOTE ADULT - PROBLEM SELECTOR PLAN 3
Postop CHB with EPWs attached to EPM. Threshold 9. No pacing burden at this time. BP remains stable with SBP 90-100s.   Converted to NSR 12/4.   As of 12/5 per EP note, now conducting 1:1 with LBBB, and first degree block which is an improvement  EP to decide if patient candidate for micra-leadless PPM  EP following. Postop CHB with EPWs attached to EPM. Threshold 9. No pacing burden at this time. BP remains stable with SBP 90-100s.   Converted to NSR 12/4.   no pacemaker at this time per EP, ok to remove PW, plan for MCOT x 1 week on discharge

## 2021-12-07 NOTE — PROGRESS NOTE ADULT - SUBJECTIVE AND OBJECTIVE BOX
POD 7 s/p mechanical AVR, hemirarch    Subjective: c/o pain, reports no BM  denies CP, palpitations, SOB, cough, fever, chills, itchiness/rash, diaphoresis, vision changes, HA, dizziness/lightheadedness, numbness/tingling, abd pain, N/V     T(C): 36.7 (12-06-21 @ 21:44), Max: 37.8 (12-06-21 @ 16:00)  HR: 86 (12-06-21 @ 21:44) (69 - 90)  BP: 114/69 (12-06-21 @ 21:44) (91/51 - 114/69)  ABP: --  ABP(mean): --  RR: 17 (12-06-21 @ 21:44) (17 - 18)  SpO2: 97% (12-06-21 @ 21:44) (95% - 97%)    12-06    134<L>  |  98  |  7.2<L>  ----------------------------<  101<H>  4.2   |  24.0  |  0.47<L>    Ca    8.8      06 Dec 2021 05:39  Mg     1.6     12-06    TPro  6.4<L>  /  Alb  3.6  /  TBili  0.5  /  DBili  x   /  AST  18  /  ALT  28  /  AlkPhos  50  12-06                               7.8    7.93  )-----------( 246      ( 06 Dec 2021 05:39 )             24.6   PT/INR - ( 06 Dec 2021 10:48 )   PT: 19.5 sec;   INR: 1.73 ratio    PTT - ( 05 Dec 2021 07:20 )  PTT:28.3 sec    I&O's Detail    05 Dec 2021 07:01  -  06 Dec 2021 07:00  --------------------------------------------------------  IN:    Oral Fluid: 720 mL  Total IN: 720 mL    OUT:    Chest Tube (mL): 24 mL    Voided (mL): 1225 mL  Total OUT: 1249 mL  Total NET: -529 mL    06 Dec 2021 07:01  -  07 Dec 2021 03:08  --------------------------------------------------------  IN:    Oral Fluid: 800 mL  Total IN: 800 mL    OUT:    Chest Tube (mL): 24 mL    Voided (mL): 1180 mL  Total OUT: 1204 mL  Total NET: -404 mL    Drug Dosing Weight  Height (cm): 157.5 (30 Nov 2021 06:54)  Weight (kg): 68.3 (30 Nov 2021 06:54)  BMI (kg/m2): 27.5 (30 Nov 2021 06:54)  BSA (m2): 1.69 (30 Nov 2021 06:54)    MEDICATIONS  (STANDING):  aspirin enteric coated 81 milliGRAM(s) Oral daily  enoxaparin Injectable 70 milliGRAM(s) SubCutaneous every 12 hours  gabapentin 300 milliGRAM(s) Oral every 8 hours  influenza   Vaccine 0.5 milliLiter(s) IntraMuscular once  lidocaine   4% Patch 1 Patch Transdermal daily  pantoprazole    Tablet 40 milliGRAM(s) Oral daily  polyethylene glycol 3350 17 Gram(s) Oral two times a day  senna 2 Tablet(s) Oral at bedtime  sodium chloride 0.9% lock flush 3 milliLiter(s) IV Push every 8 hours    MEDICATIONS  (PRN):  methocarbamol 500 milliGRAM(s) Oral every 8 hours PRN Muscle Spasm  oxyCODONE    IR 5 milliGRAM(s) Oral every 4 hours PRN Moderate Pain (4 - 6)  oxyCODONE    IR 10 milliGRAM(s) Oral every 4 hours PRN Severe Pain (7 - 10)    Physical Exam  Gen: NAD  Neuro: A&Ox3 non focal speech clear and intact  Pulm: decreased BS bases b/l no wheezing  CV: S1S2 RRR + murmur  Abd: +BS soft NT ND  Extrem/MS: no edema/cyanosis, WWP, BORDEN  Incision(s): mid sternal dsg C/D/I, sternum stable no click  A/V wires EPM VVI 40/20/2.0, pericardial camila to suction, no air leak

## 2021-12-08 LAB
ANION GAP SERPL CALC-SCNC: 11 MMOL/L — SIGNIFICANT CHANGE UP (ref 5–17)
BASOPHILS # BLD AUTO: 0.02 K/UL — SIGNIFICANT CHANGE UP (ref 0–0.2)
BASOPHILS NFR BLD AUTO: 0.3 % — SIGNIFICANT CHANGE UP (ref 0–2)
BUN SERPL-MCNC: 6 MG/DL — LOW (ref 8–20)
CALCIUM SERPL-MCNC: 8.8 MG/DL — SIGNIFICANT CHANGE UP (ref 8.6–10.2)
CHLORIDE SERPL-SCNC: 101 MMOL/L — SIGNIFICANT CHANGE UP (ref 98–107)
CO2 SERPL-SCNC: 24 MMOL/L — SIGNIFICANT CHANGE UP (ref 22–29)
CREAT SERPL-MCNC: 0.4 MG/DL — LOW (ref 0.5–1.3)
EOSINOPHIL # BLD AUTO: 0 K/UL — SIGNIFICANT CHANGE UP (ref 0–0.5)
EOSINOPHIL NFR BLD AUTO: 0 % — SIGNIFICANT CHANGE UP (ref 0–6)
GLUCOSE SERPL-MCNC: 107 MG/DL — HIGH (ref 70–99)
HCT VFR BLD CALC: 24.5 % — LOW (ref 34.5–45)
HGB BLD-MCNC: 7.8 G/DL — LOW (ref 11.5–15.5)
IMM GRANULOCYTES NFR BLD AUTO: 0.7 % — SIGNIFICANT CHANGE UP (ref 0–1.5)
INR BLD: 1.86 RATIO — HIGH (ref 0.88–1.16)
LYMPHOCYTES # BLD AUTO: 0.86 K/UL — LOW (ref 1–3.3)
LYMPHOCYTES # BLD AUTO: 11.4 % — LOW (ref 13–44)
MAGNESIUM SERPL-MCNC: 1.9 MG/DL — SIGNIFICANT CHANGE UP (ref 1.6–2.6)
MCHC RBC-ENTMCNC: 29 PG — SIGNIFICANT CHANGE UP (ref 27–34)
MCHC RBC-ENTMCNC: 31.8 GM/DL — LOW (ref 32–36)
MCV RBC AUTO: 91.1 FL — SIGNIFICANT CHANGE UP (ref 80–100)
MONOCYTES # BLD AUTO: 0.88 K/UL — SIGNIFICANT CHANGE UP (ref 0–0.9)
MONOCYTES NFR BLD AUTO: 11.6 % — SIGNIFICANT CHANGE UP (ref 2–14)
NEUTROPHILS # BLD AUTO: 5.76 K/UL — SIGNIFICANT CHANGE UP (ref 1.8–7.4)
NEUTROPHILS NFR BLD AUTO: 76 % — SIGNIFICANT CHANGE UP (ref 43–77)
PLATELET # BLD AUTO: 296 K/UL — SIGNIFICANT CHANGE UP (ref 150–400)
POTASSIUM SERPL-MCNC: 3.8 MMOL/L — SIGNIFICANT CHANGE UP (ref 3.5–5.3)
POTASSIUM SERPL-SCNC: 3.8 MMOL/L — SIGNIFICANT CHANGE UP (ref 3.5–5.3)
PROTHROM AB SERPL-ACNC: 20.9 SEC — HIGH (ref 10.6–13.6)
RBC # BLD: 2.69 M/UL — LOW (ref 3.8–5.2)
RBC # FLD: 12.3 % — SIGNIFICANT CHANGE UP (ref 10.3–14.5)
SODIUM SERPL-SCNC: 136 MMOL/L — SIGNIFICANT CHANGE UP (ref 135–145)
WBC # BLD: 7.57 K/UL — SIGNIFICANT CHANGE UP (ref 3.8–10.5)
WBC # FLD AUTO: 7.57 K/UL — SIGNIFICANT CHANGE UP (ref 3.8–10.5)

## 2021-12-08 PROCEDURE — 71045 X-RAY EXAM CHEST 1 VIEW: CPT | Mod: 26

## 2021-12-08 RX ORDER — MAGNESIUM SULFATE 500 MG/ML
1 VIAL (ML) INJECTION ONCE
Refills: 0 | Status: COMPLETED | OUTPATIENT
Start: 2021-12-08 | End: 2021-12-08

## 2021-12-08 RX ORDER — WARFARIN SODIUM 2.5 MG/1
10 TABLET ORAL ONCE
Refills: 0 | Status: COMPLETED | OUTPATIENT
Start: 2021-12-08 | End: 2021-12-08

## 2021-12-08 RX ADMIN — LIDOCAINE 1 PATCH: 4 CREAM TOPICAL at 09:05

## 2021-12-08 RX ADMIN — LIDOCAINE 1 PATCH: 4 CREAM TOPICAL at 07:40

## 2021-12-08 RX ADMIN — OXYCODONE HYDROCHLORIDE 5 MILLIGRAM(S): 5 TABLET ORAL at 05:50

## 2021-12-08 RX ADMIN — PANTOPRAZOLE SODIUM 40 MILLIGRAM(S): 20 TABLET, DELAYED RELEASE ORAL at 10:48

## 2021-12-08 RX ADMIN — Medication 100 GRAM(S): at 07:35

## 2021-12-08 RX ADMIN — GABAPENTIN 300 MILLIGRAM(S): 400 CAPSULE ORAL at 14:31

## 2021-12-08 RX ADMIN — LIDOCAINE 1 PATCH: 4 CREAM TOPICAL at 21:11

## 2021-12-08 RX ADMIN — OXYCODONE HYDROCHLORIDE 5 MILLIGRAM(S): 5 TABLET ORAL at 21:10

## 2021-12-08 RX ADMIN — OXYCODONE HYDROCHLORIDE 10 MILLIGRAM(S): 5 TABLET ORAL at 16:34

## 2021-12-08 RX ADMIN — OXYCODONE HYDROCHLORIDE 5 MILLIGRAM(S): 5 TABLET ORAL at 23:03

## 2021-12-08 RX ADMIN — ENOXAPARIN SODIUM 70 MILLIGRAM(S): 100 INJECTION SUBCUTANEOUS at 21:10

## 2021-12-08 RX ADMIN — WARFARIN SODIUM 10 MILLIGRAM(S): 2.5 TABLET ORAL at 21:10

## 2021-12-08 RX ADMIN — OXYCODONE HYDROCHLORIDE 5 MILLIGRAM(S): 5 TABLET ORAL at 10:56

## 2021-12-08 RX ADMIN — SODIUM CHLORIDE 3 MILLILITER(S): 9 INJECTION INTRAMUSCULAR; INTRAVENOUS; SUBCUTANEOUS at 05:47

## 2021-12-08 RX ADMIN — ENOXAPARIN SODIUM 70 MILLIGRAM(S): 100 INJECTION SUBCUTANEOUS at 10:49

## 2021-12-08 RX ADMIN — GABAPENTIN 300 MILLIGRAM(S): 400 CAPSULE ORAL at 05:49

## 2021-12-08 RX ADMIN — GABAPENTIN 300 MILLIGRAM(S): 400 CAPSULE ORAL at 21:10

## 2021-12-08 RX ADMIN — SODIUM CHLORIDE 3 MILLILITER(S): 9 INJECTION INTRAMUSCULAR; INTRAVENOUS; SUBCUTANEOUS at 21:20

## 2021-12-08 RX ADMIN — SENNA PLUS 2 TABLET(S): 8.6 TABLET ORAL at 21:10

## 2021-12-08 RX ADMIN — OXYCODONE HYDROCHLORIDE 5 MILLIGRAM(S): 5 TABLET ORAL at 11:25

## 2021-12-08 RX ADMIN — SODIUM CHLORIDE 3 MILLILITER(S): 9 INJECTION INTRAMUSCULAR; INTRAVENOUS; SUBCUTANEOUS at 13:49

## 2021-12-08 RX ADMIN — Medication 81 MILLIGRAM(S): at 10:49

## 2021-12-08 NOTE — CHART NOTE - NSCHARTNOTEFT_GEN_A_CORE
Dr. Munguia notified about potential heart murmur. Dr. Munguia at the bedside assessed murmur and clarified finding normal for this particular On- x valve.
Patient seen and examined. Notes, flowsheets, medications, radiologic images and labs reviewed.    Pt transferred from CTICU hemodynamically stable.  Left chest tube removed CXR ordered.
remains in SR with normal ME and LBBB. No drop beats. INR still low, not to go home today as by CTS team. Pls contact us when DC date is determined to arrange for MCOT and EP follow up.

## 2021-12-09 ENCOUNTER — TRANSCRIPTION ENCOUNTER (OUTPATIENT)
Age: 31
End: 2021-12-09

## 2021-12-09 VITALS
HEART RATE: 67 BPM | DIASTOLIC BLOOD PRESSURE: 60 MMHG | OXYGEN SATURATION: 99 % | SYSTOLIC BLOOD PRESSURE: 98 MMHG | RESPIRATION RATE: 17 BRPM

## 2021-12-09 PROBLEM — Q23.1 CONGENITAL INSUFFICIENCY OF AORTIC VALVE: Chronic | Status: ACTIVE | Noted: 2021-11-18

## 2021-12-09 PROBLEM — I71.2 THORACIC AORTIC ANEURYSM, WITHOUT RUPTURE: Chronic | Status: ACTIVE | Noted: 2021-11-18

## 2021-12-09 PROBLEM — Q23.0 CONGENITAL STENOSIS OF AORTIC VALVE: Chronic | Status: ACTIVE | Noted: 2021-11-18

## 2021-12-09 LAB
ALBUMIN SERPL ELPH-MCNC: 3.1 G/DL — LOW (ref 3.3–5.2)
ALP SERPL-CCNC: 64 U/L — SIGNIFICANT CHANGE UP (ref 40–120)
ALT FLD-CCNC: 31 U/L — SIGNIFICANT CHANGE UP
ANION GAP SERPL CALC-SCNC: 12 MMOL/L — SIGNIFICANT CHANGE UP (ref 5–17)
AST SERPL-CCNC: 21 U/L — SIGNIFICANT CHANGE UP
BILIRUB SERPL-MCNC: 0.3 MG/DL — LOW (ref 0.4–2)
BUN SERPL-MCNC: 8.2 MG/DL — SIGNIFICANT CHANGE UP (ref 8–20)
CALCIUM SERPL-MCNC: 8.2 MG/DL — LOW (ref 8.6–10.2)
CHLORIDE SERPL-SCNC: 101 MMOL/L — SIGNIFICANT CHANGE UP (ref 98–107)
CO2 SERPL-SCNC: 23 MMOL/L — SIGNIFICANT CHANGE UP (ref 22–29)
CREAT SERPL-MCNC: 0.44 MG/DL — LOW (ref 0.5–1.3)
GLUCOSE SERPL-MCNC: 110 MG/DL — HIGH (ref 70–99)
HCT VFR BLD CALC: 21.7 % — LOW (ref 34.5–45)
HGB BLD-MCNC: 6.9 G/DL — CRITICAL LOW (ref 11.5–15.5)
INR BLD: 1.98 RATIO — HIGH (ref 0.88–1.16)
INR PPP: NORMAL RATIO
MAGNESIUM SERPL-MCNC: 1.7 MG/DL — SIGNIFICANT CHANGE UP (ref 1.6–2.6)
MCHC RBC-ENTMCNC: 28.8 PG — SIGNIFICANT CHANGE UP (ref 27–34)
MCHC RBC-ENTMCNC: 31.8 GM/DL — LOW (ref 32–36)
MCV RBC AUTO: 90.4 FL — SIGNIFICANT CHANGE UP (ref 80–100)
PHOSPHATE SERPL-MCNC: 4 MG/DL — SIGNIFICANT CHANGE UP (ref 2.4–4.7)
PLATELET # BLD AUTO: 308 K/UL — SIGNIFICANT CHANGE UP (ref 150–400)
POTASSIUM SERPL-MCNC: 4 MMOL/L — SIGNIFICANT CHANGE UP (ref 3.5–5.3)
POTASSIUM SERPL-SCNC: 4 MMOL/L — SIGNIFICANT CHANGE UP (ref 3.5–5.3)
PROT SERPL-MCNC: 6.1 G/DL — LOW (ref 6.6–8.7)
PROTHROM AB SERPL-ACNC: 22.2 SEC — HIGH (ref 10.6–13.6)
QUALITY CONTROL: YES
RBC # BLD: 2.4 M/UL — LOW (ref 3.8–5.2)
RBC # FLD: 12.5 % — SIGNIFICANT CHANGE UP (ref 10.3–14.5)
SODIUM SERPL-SCNC: 136 MMOL/L — SIGNIFICANT CHANGE UP (ref 135–145)
WBC # BLD: 7.6 K/UL — SIGNIFICANT CHANGE UP (ref 3.8–10.5)
WBC # FLD AUTO: 7.6 K/UL — SIGNIFICANT CHANGE UP (ref 3.8–10.5)

## 2021-12-09 PROCEDURE — 93010 ELECTROCARDIOGRAM REPORT: CPT

## 2021-12-09 PROCEDURE — 88305 TISSUE EXAM BY PATHOLOGIST: CPT

## 2021-12-09 PROCEDURE — 83605 ASSAY OF LACTIC ACID: CPT

## 2021-12-09 PROCEDURE — 71045 X-RAY EXAM CHEST 1 VIEW: CPT

## 2021-12-09 PROCEDURE — 85610 PROTHROMBIN TIME: CPT

## 2021-12-09 PROCEDURE — 36415 COLL VENOUS BLD VENIPUNCTURE: CPT

## 2021-12-09 PROCEDURE — 59050 FETAL MONITOR W/REPORT: CPT

## 2021-12-09 PROCEDURE — 94002 VENT MGMT INPAT INIT DAY: CPT

## 2021-12-09 PROCEDURE — 80074 ACUTE HEPATITIS PANEL: CPT

## 2021-12-09 PROCEDURE — 97530 THERAPEUTIC ACTIVITIES: CPT

## 2021-12-09 PROCEDURE — 86703 HIV-1/HIV-2 1 RESULT ANTBDY: CPT

## 2021-12-09 PROCEDURE — 82435 ASSAY OF BLOOD CHLORIDE: CPT

## 2021-12-09 PROCEDURE — 85730 THROMBOPLASTIN TIME PARTIAL: CPT

## 2021-12-09 PROCEDURE — 93308 TTE F-UP OR LMTD: CPT

## 2021-12-09 PROCEDURE — 85027 COMPLETE CBC AUTOMATED: CPT

## 2021-12-09 PROCEDURE — 93312 ECHO TRANSESOPHAGEAL: CPT

## 2021-12-09 PROCEDURE — 93308 TTE F-UP OR LMTD: CPT | Mod: 26

## 2021-12-09 PROCEDURE — 80053 COMPREHEN METABOLIC PANEL: CPT

## 2021-12-09 PROCEDURE — 82803 BLOOD GASES ANY COMBINATION: CPT

## 2021-12-09 PROCEDURE — P9045: CPT

## 2021-12-09 PROCEDURE — 80048 BASIC METABOLIC PNL TOTAL CA: CPT

## 2021-12-09 PROCEDURE — 82962 GLUCOSE BLOOD TEST: CPT

## 2021-12-09 PROCEDURE — 83735 ASSAY OF MAGNESIUM: CPT

## 2021-12-09 PROCEDURE — 84132 ASSAY OF SERUM POTASSIUM: CPT

## 2021-12-09 PROCEDURE — 85014 HEMATOCRIT: CPT

## 2021-12-09 PROCEDURE — 93005 ELECTROCARDIOGRAM TRACING: CPT

## 2021-12-09 PROCEDURE — 99231 SBSQ HOSP IP/OBS SF/LOW 25: CPT

## 2021-12-09 PROCEDURE — 97116 GAIT TRAINING THERAPY: CPT

## 2021-12-09 PROCEDURE — 84295 ASSAY OF SERUM SODIUM: CPT

## 2021-12-09 PROCEDURE — C1894: CPT

## 2021-12-09 PROCEDURE — 85025 COMPLETE CBC W/AUTO DIFF WBC: CPT

## 2021-12-09 PROCEDURE — C1889: CPT

## 2021-12-09 PROCEDURE — 82947 ASSAY GLUCOSE BLOOD QUANT: CPT

## 2021-12-09 PROCEDURE — 97163 PT EVAL HIGH COMPLEX 45 MIN: CPT

## 2021-12-09 PROCEDURE — 85018 HEMOGLOBIN: CPT

## 2021-12-09 PROCEDURE — 84100 ASSAY OF PHOSPHORUS: CPT

## 2021-12-09 PROCEDURE — 71045 X-RAY EXAM CHEST 1 VIEW: CPT | Mod: 26

## 2021-12-09 PROCEDURE — C1768: CPT

## 2021-12-09 PROCEDURE — 82330 ASSAY OF CALCIUM: CPT

## 2021-12-09 PROCEDURE — C1769: CPT

## 2021-12-09 RX ORDER — WARFARIN SODIUM 2.5 MG/1
1 TABLET ORAL
Qty: 30 | Refills: 1
Start: 2021-12-09 | End: 2022-02-06

## 2021-12-09 RX ORDER — ASCORBIC ACID 60 MG
500 TABLET,CHEWABLE ORAL DAILY
Refills: 0 | Status: DISCONTINUED | OUTPATIENT
Start: 2021-12-09 | End: 2021-12-09

## 2021-12-09 RX ORDER — ASPIRIN/CALCIUM CARB/MAGNESIUM 324 MG
1 TABLET ORAL
Qty: 30 | Refills: 1
Start: 2021-12-09 | End: 2022-02-06

## 2021-12-09 RX ORDER — FERROUS SULFATE 325(65) MG
325 TABLET ORAL DAILY
Refills: 0 | Status: DISCONTINUED | OUTPATIENT
Start: 2021-12-09 | End: 2021-12-09

## 2021-12-09 RX ORDER — ERYTHROPOIETIN 10000 [IU]/ML
8000 INJECTION, SOLUTION INTRAVENOUS; SUBCUTANEOUS ONCE
Refills: 0 | Status: COMPLETED | OUTPATIENT
Start: 2021-12-09 | End: 2021-12-09

## 2021-12-09 RX ORDER — OXYCODONE HYDROCHLORIDE 5 MG/1
1 TABLET ORAL
Qty: 20 | Refills: 0
Start: 2021-12-09 | End: 2021-12-13

## 2021-12-09 RX ORDER — OXYCODONE HYDROCHLORIDE 5 MG/1
5 TABLET ORAL EVERY 4 HOURS
Refills: 0 | Status: DISCONTINUED | OUTPATIENT
Start: 2021-12-09 | End: 2021-12-09

## 2021-12-09 RX ORDER — WARFARIN SODIUM 2.5 MG/1
10 TABLET ORAL ONCE
Refills: 0 | Status: DISCONTINUED | OUTPATIENT
Start: 2021-12-09 | End: 2021-12-09

## 2021-12-09 RX ORDER — OXYCODONE HYDROCHLORIDE 5 MG/1
10 TABLET ORAL EVERY 4 HOURS
Refills: 0 | Status: DISCONTINUED | OUTPATIENT
Start: 2021-12-09 | End: 2021-12-09

## 2021-12-09 RX ORDER — ASCORBIC ACID 60 MG
1 TABLET,CHEWABLE ORAL
Qty: 30 | Refills: 0
Start: 2021-12-09 | End: 2022-01-07

## 2021-12-09 RX ORDER — SENNA PLUS 8.6 MG/1
2 TABLET ORAL
Qty: 60 | Refills: 0
Start: 2021-12-09 | End: 2022-01-07

## 2021-12-09 RX ORDER — FOLIC ACID 0.8 MG
1 TABLET ORAL DAILY
Refills: 0 | Status: DISCONTINUED | OUTPATIENT
Start: 2021-12-09 | End: 2021-12-09

## 2021-12-09 RX ORDER — FOLIC ACID 0.8 MG
1 TABLET ORAL
Qty: 30 | Refills: 0
Start: 2021-12-09 | End: 2022-01-07

## 2021-12-09 RX ORDER — FERROUS SULFATE 325(65) MG
1 TABLET ORAL
Qty: 30 | Refills: 0
Start: 2021-12-09 | End: 2022-01-07

## 2021-12-09 RX ADMIN — ERYTHROPOIETIN 8000 UNIT(S): 10000 INJECTION, SOLUTION INTRAVENOUS; SUBCUTANEOUS at 09:51

## 2021-12-09 RX ADMIN — LIDOCAINE 1 PATCH: 4 CREAM TOPICAL at 09:31

## 2021-12-09 RX ADMIN — SODIUM CHLORIDE 3 MILLILITER(S): 9 INJECTION INTRAMUSCULAR; INTRAVENOUS; SUBCUTANEOUS at 12:50

## 2021-12-09 RX ADMIN — Medication 1 MILLIGRAM(S): at 09:30

## 2021-12-09 RX ADMIN — OXYCODONE HYDROCHLORIDE 10 MILLIGRAM(S): 5 TABLET ORAL at 04:38

## 2021-12-09 RX ADMIN — Medication 325 MILLIGRAM(S): at 09:30

## 2021-12-09 RX ADMIN — SODIUM CHLORIDE 3 MILLILITER(S): 9 INJECTION INTRAMUSCULAR; INTRAVENOUS; SUBCUTANEOUS at 05:07

## 2021-12-09 RX ADMIN — GABAPENTIN 300 MILLIGRAM(S): 400 CAPSULE ORAL at 05:08

## 2021-12-09 RX ADMIN — Medication 500 MILLIGRAM(S): at 09:30

## 2021-12-09 RX ADMIN — Medication 81 MILLIGRAM(S): at 09:30

## 2021-12-09 RX ADMIN — LIDOCAINE 1 PATCH: 4 CREAM TOPICAL at 09:04

## 2021-12-09 RX ADMIN — OXYCODONE HYDROCHLORIDE 5 MILLIGRAM(S): 5 TABLET ORAL at 09:30

## 2021-12-09 RX ADMIN — PANTOPRAZOLE SODIUM 40 MILLIGRAM(S): 20 TABLET, DELAYED RELEASE ORAL at 09:30

## 2021-12-09 RX ADMIN — OXYCODONE HYDROCHLORIDE 10 MILLIGRAM(S): 5 TABLET ORAL at 03:25

## 2021-12-09 NOTE — PROGRESS NOTE ADULT - ASSESSMENT
31 year old female former smoker, ,  x 2, with a medical history of Congential Heart Disease, Bicuspid Aortic Valve, Murmur, Severe Aortic Stenosis s/p open aortic valvuloplasty under cardiopulmonary bypass on 2/15/2001, now with c/o episodes of near syncope, ZHAO, chest discomfort, and palpitations. Patient underwent AVR (#19 On-X mechanical valve) and replacement of ascending aorta (hemiarch with #24 Gelweave graft) on 21 with Dr. Munguia. Postoperative course significant for complete heart block (beta-blocker held, converted back to NSR , maintaining adequate BP, EP following, plan for MCOT for continued telemetry monitoring on discharge).

## 2021-12-09 NOTE — DISCHARGE NOTE NURSING/CASE MANAGEMENT/SOCIAL WORK - NSFLUVACAGEDISCH_IMM_ALL_CORE
History and Physical     Ms. Holli Parada is a 26 year old year old year old  currently at 26w6d gestation. Her pregnancy is complicated by type 1 diabetes mellitus presently euglycemic on insulin therapy, history of unilateral nephrectomy with end-stage renal disease, chronic hypertension, and secondary hyperparathyroidism. Initiation of renal dialysis has been strongly recommended by Dr. Juarez from nephrology as well as Maternal Fetal medicine, however has declined to this point due to great concerns about losing her job should she come in the hospital prior to being approved for short-term disability.     Past Medical History:   Diagnosis Date   • Acute kidney injury (CMS/[facility]) 2019   • Chronic kidney disease     right nephrectomy-    • Depressive disorder, not elsewhere classified 2012   • Diabetes mellitus type 1 (CMS/[facility]) 2006    diagnosed in 7th grade age 14 years   • Diabetic neuropathy (CMS/[facility])    • Dry eye 10/7/2013   • Essential (primary) hypertension    • Eye pain 2013   • H/O unilateral nephrectomy     Due to severe pyelonephritis   • Herpes simplex virus infection    • Insomnia, unspecified 2012   • Iritis 2013   • Neuropathic pain of both legs 2012   • Polyneuropathy in diabetes(357.2) 2012   • STD (sexually transmitted disease)    • Type I (juvenile type) diabetes mellitus without mention of complication, not stated as uncontrolled 2013     OB History    Para Term  AB Living   2 0 0 0 1 0   SAB TAB Ectopic Molar Multiple Live Births   1 0 0 0 0 0     Past Surgical History:   Procedure Laterality Date   • Nephrectomy Right Summer 2014    emphysematous pyelonephritis with sepsis   • Pap smear  2013    NL   • Tonsillectomy and adenoidectomy       Family History   Problem Relation Age of Onset   • Asthma Mother    • Psychiatry Mother         bipolar   • Diabetes Father         type 1   •  Heart Father         CAD s/p MI at age 39, CABG   • Hypertension Father    • Hypertension Paternal Grandfather    • Heart Paternal Grandfather         MI x2   • ADHD/ADD Sister    • Psychiatry Brother         Aspergers   • Other Brother         Autistic   • Thyroid Neg Hx      Social History     Socioeconomic History   • Marital status:      Spouse name: Not on file   • Number of children: Not on file   • Years of education: Not on file   • Highest education level: Not on file   Occupational History   • Occupation: retail     Employer: [employer]   Social Needs   • Financial resource strain: Not on file   • Food insecurity:     Worry: Not on file     Inability: Not on file   • Transportation needs:     Medical: Not on file     Non-medical: Not on file   Tobacco Use   • Smoking status: Never Smoker   • Smokeless tobacco: Never Used   Substance and Sexual Activity   • Alcohol use: No   • Drug use: No   • Sexual activity: Not Currently     Partners: Male     Birth control/protection: None   Lifestyle   • Physical activity:     Days per week: Not on file     Minutes per session: Not on file   • Stress: Not on file   Relationships   • Social connections:     Talks on phone: Not on file     Gets together: Not on file     Attends Taoism service: Not on file     Active member of club or organization: Not on file     Attends meetings of clubs or organizations: Not on file     Relationship status: Not on file   • Intimate partner violence:     Fear of current or ex partner: Not on file     Emotionally abused: Not on file     Physically abused: Not on file     Forced sexual activity: Not on file   Other Topics Concern   • Not on file   Social History Narrative   • Not on file     ALLERGIES:   Allergen Reactions   • Mold   (Environmental) SHORTNESS OF BREATH and Other (See Comments)     Itchy, watery eyes and hard to breath.   • Grass RASH and PRURITUS       REVIEW OF SYSTEMS:  At present, she reports no vaginal  bleeding, uterine contractions or cramping, or leakage of amniotic fluid. The patient reports fetal movement. Denies dizziness, chest pain, or headache. He reports that her blood sugars have been well-controlled     PHYSICAL EXAM:  BP: 143-154/92-98 mmHg  HEENT:  Normocephalic, atraumatic.  Moist mucous membranes.  Extraocular muscles intact.  No conjunctival erythema or scleral icterus.   RESPIRATORY:  Nonlabored breathing.  ABDOMEN:  Soft, nondistended, nontender.  Nontender gravid uterus.  MUSCULOSKELETAL:  Normal stance and gait. No clubbing, cyanosis. Minimal lower extremity edema.  NEUROLOGLC:  Cranial nerves 2-12 grossly intact  SKIN:  No rashes or lesions noted. Warm and dry.   PSYCHIATRIC:  Mood stable, affect congruent, alert and oriented.    Ultrasound: Normal growth. BPP: . Mild polyhydramnios.    Assessment: Ms. Holli Parada is a 26 year old year old year old  currently at 26w6d gestation. Her pregnancy is complicated by type 1 diabetes mellitus presently euglycemic on insulin therapy, history of unilateral nephrectomy with end-stage renal disease, chronic hypertension, and secondary hyperparathyroidism. Initiation of renal dialysis has been strongly recommended by Dr. Juarez from nephrology as well as Maternal Fetal medicine, however has declined to this point due to great concerns about losing her job should she come in the hospital prior to being approved for short-term disability.     After extensive counseling, the patient has agree to admission to labor and delivery on 2019 for central venous catheter placement and initiation of renal dialysis.     Plan:  1) Increase nifedipine XL to 60 mg po bid.  2) Continue current insulin dose of Levemir 25 units qhs with 5 units of Humalog with meals  3) Continue renal diet with low phosphorus intake.  4) Vitamin D supplementation 5000 international units per day  5) Continue Lovenox 40 mg SC daily.  6) Repeat CBC and CMP every 1-2 weeks  7)  Complete 28 week labs.  8) Admission to [facility] for central venous catheter placement and initiation of renal dialysis on 9/16/2019. Discussed with Dr. Juarez from Nephrology who is aware of care plan and is in agreement. Also discussed with OB Hospitalist team physicians (Jennie Titus and Jasson).  9) [facility]  10) Delivery at 34-35 weeks gestation with earlier delivery as clinically indicated.    I stressed the importance of follow-up and instructed the patient to call should she have any concerns.    Alvino Padgett MD    25 minutes spent with >1/2 coordinating care and counseling.     Adult

## 2021-12-09 NOTE — PROGRESS NOTE ADULT - PROBLEM SELECTOR PROBLEM 1
Congenital stenosis of aortic valve

## 2021-12-09 NOTE — PROGRESS NOTE ADULT - ASSESSMENT
32 yo F   x2 LMP 10/2021 PMH of Congential Heart Disease, Bicuspid Aortic Valve, Murmur, and Severe Aortic Stenosis. At the age of 10 patient underwent an open aortic valvuloplasty under cardiopulmonary bypass on 2/15/2001. Patient is now experiencing intermittent episodes of near syncope and palpitations. She denies SOB but c/o occasional exertional dyspnea with some chest discomfort. She is a  and walks routinely throughout the day. She quit smoking 1-1/2 months ago. Preop assessment prior to reoperation sternotomy, aortic valve replacement, ascending hemiarch replacement w/Dr Munguia scheduled for 2021    Above from H and P on (2021 15:14)  - Pt reports lossing to follow up for many years till earlier this year when she saw a cardiologist for ZHAO and one presyncope episode. Found to have severe AS, and aortic now s/p mechanical AVR and hemiarch replacement (19 ON-X Valve, 23 hemashield) on 2021.   - Post op she has been in CHB with junctional escape and RBBB, VR in the 50s.   - CTS/CTICU team reported high epicardial wires threshold at 9 today, but she did not have any pacing requirements so far.   - She denies any palpitation, or syncope  - Social hx: remote weed use, ex smoker, stopped 2 months ago. no alcohol or drug abuse  - family hx: 2 kids, one with PV disease. The other has been screened and has no CHD. Mother and maternal GM had BAV  - Tele this morning (in 4 tower) showed occasional conducted beats with 1 st AVB   - she has been started on warfarin with low dose LMWH, INR still sub-theraputic     Interval Hx -12/3:  - Doing well, feeling better, one CT is out.   - Overnight rhythm was sinus with brief second degree AVB. Now tele shows SR with 1st AVB and one to one conduction.   - EKG : SR, 1st AVB,  ms and LBBB,  ms.     Interval Hx 12/3-:   - Feeling better, has been ambulating more.   - Overnight rhythm sinus with brief 2nd AV block and Mobitz 1. Now sinus rhythm with 1:1 conduction.      Interval Hx -12/6:  Doing well, remains in SR with LBBB and one to one AV conduction. Last episode of second degree AVB was on 12/3. EKG today: SR with normal DE, LBBB and  ms. She is anxious to go home. Attempted atrial pacing but she did not tolerate this due to phrenic stimulation. INR still sub-theraputic     interval Hx -  doing well, INR today 1.98. Tele continued to show 1:1 conduction with normal DE and narrower QRS today. Last EKG from     1. CHB post AVR (2021)  - Currently in sinus rhythm with 1:1 conduction. Episodes of 2nd degree AV block was last seen on 12/3/21. Now 1:1 conduction upt ot 105 bpm    - Ok to go home from our standpoint. Live Huang DALEY ordered, instructed patient to pick it up on her way home from my office, will see her in follow up on  or sooner if needed  - pls obtain 12 lead EKG prior to DC to r/a LBBB, probably recovering based on tele  - counseled about red flags and to seek immediate medical attention if got dizziness, syncope, presyncope, SOB or palpitation     2. BAV and AS, s/p mechanical AVR and timothy-arch as above. INR is around 2 today    above d/w pt and CTS team

## 2021-12-09 NOTE — PROGRESS NOTE ADULT - PROBLEM SELECTOR PLAN 4
Lovenox full dose bridging to Coumadin and SCDs for DVT prophylaxis and valve prophylaxis.  Protonix for GI prophylaxis.  Continue with bowel regimen PRN.    Plan to be discussed / reviewed with CT Surgery attending / team during AM rounds.

## 2021-12-09 NOTE — PROGRESS NOTE ADULT - PROBLEM SELECTOR PLAN 1
S/p Redo-sternotomy, aortic valve replacement, ascending hemiarch replacement 11/30.  Neurologically intact.  Hemodynamically stable.  Continue aspirin.  Lovenox Bridging to Coumadin in setting of mechanical valve. INR goal ~2 per Dr. Munguia.  Follow up AM INR.   INR lab draws set up for home and INR to be checked by Cardio Dr. Yao on discharge.   Holding Lopressor in setting of recent CHB.  Oxygenating well, coughing and deep breathing exercises/incentive spirometry encouraged.  Increased ambulation encouraged.   Replete electrolytes PRN.   Oxy, gabapentin, lidoderm, robaxin PRN for analgesia.  Dispo: Home later today pending therapeutic INR and pre-discharge TTE.   Plan to be discussed / reviewed with CT Surgery attending / team during AM rounds.

## 2021-12-09 NOTE — DISCHARGE NOTE NURSING/CASE MANAGEMENT/SOCIAL WORK - NSDCPEFALRISK_GEN_ALL_CORE
For information on Fall & Injury Prevention, visit: [email or web address] OR  [email or web address] OR  https://www.cdc.gov/steadi/patient.html

## 2021-12-09 NOTE — PROGRESS NOTE ADULT - PROBLEM SELECTOR PROBLEM 3
Complete heart block
Need for prophylactic measure
Need for prophylactic measure
Complete heart block

## 2021-12-09 NOTE — PROGRESS NOTE ADULT - PROBLEM SELECTOR PLAN 2
Plan as above.
s/p redo sternotomy, aortic valve replacement, ascending hemiarch replacement
Plan as above.
Plan as above.
preop assessment, reoperation sternotomy, aortic valve replacement, ascending hemiarch replacement w/Dr Munguia scheduled for 11/30/2021

## 2021-12-09 NOTE — PROGRESS NOTE ADULT - PROBLEM SELECTOR PLAN 3
Converted to NSR 12/4.   No pacemaker indicated at this time per EP.   Plan for MCOT x 1 week on discharge per EP.

## 2021-12-09 NOTE — PROGRESS NOTE ADULT - SUBJECTIVE AND OBJECTIVE BOX
POD # 9 s/p AVR (#19 On-X mechanical valve) and replacement of ascending aorta (hemiarch with # 24 Gelweave graft)    HPI:  30 yo F   x2 LMP 10/2021 PMH of Congential Heart Disease, Bicuspid Aortic Valve, Murmur, and Severe Aortic Stenosis. At the age of 10 patient underwent an open aortic valvuloplasty under cardiopulmonary bypass on 2/15/2001. Patient is now experiencing intermittent episodes of near syncope and palpitations. She denies SOB but c/o occasional exertional dyspnea with some chest discomfort. She is a  and walks routinely throughout the day. She quit smoking 1-1/2 months ago. Preop assessment prior to reoperation sternotomy, aortic valve replacement, ascending hemiarch replacement w/Dr Munguia scheduled for 2021    Cardiac Summary:  Echocardiogram 10/18/2021: bicuspid aortic valve bicuspid with severe stenosis. Aortic valve area 0.7 cm². Peak and mean gradients are 113 and 55 mmHg respectively. There is mild to moderate aortic valve regurgitation. The mitral valve is normal with minimal regurgitation. Biventricular function is preserved. The aortic root measures 3.5 cm and the ascending aorta measures 4.2. The aortic arch is measured at 3.6 cm.    Cardiac catheterization 10/28/2021: The coronary anatomy is right dominant. The right coronary artery is normal but small, the left-sided circulation shows no disease. The valve was not crossed for the procedure and there were no gradients.    CT angiogram 11/10/2021: The ascending aorta measures 4 cm the level of the main pulmonary artery. The aorta narrows down to 3.2 cm at the sinotubular junction. The aortic sinuses measure 3.0 x 2.6 cm and therefore not dilated. The descending thoracic aorta measures 2.1 cm in maximal diameter. (2021 15:14)    PAST MEDICAL & SURGICAL HISTORY:  Thoracic aortic aneurysm, without rupture  Congenital stenosis of aortic valve  Congenital bicuspid aortic valve  History of open heart surgery, 2001  H/O  section, 2017, 2019  H/O tubal ligation, 2019    FAMILY HISTORY:  FH: diabetes mellitus (Mother)  FH: aortic stenosis (Mother, Grandparent)    Brief Hospital Course: 31 year old female former smoker, ,  x 2, with a medical history of Congential Heart Disease, Bicuspid Aortic Valve, Murmur, Severe Aortic Stenosis s/p open aortic valvuloplasty under cardiopulmonary bypass on 2/15/2001, now with c/o episodes of near syncope, HZAO, chest discomfort, and palpitations. Patient underwent AVR (#19 On-X mechanical valve) and replacement of ascending aorta (hemiarch with #24 Gelweave graft) on 21 with Dr. Munguia. Postoperative course significant for complete heart block (beta-blocker held, converted back to NSR /, maintaining adequate BP, EP following, plan for MCOT for continued telemetry monitoring on discharge).    Significant recent/past 24 hr events: No overnight events reported.     Subjective: Patient ambulating around room in [facility] on arrival. +Pain controlled. +BM since surgery. +Tolerating diet.  Denies fevers, chills, lightheadedness, dizziness, HA, CP, palpitations, SOB, cough, abdominal pain, N/V, diarrhea, numbness/tingling in extremities, or any other acute complaints. ROS negative x 10 systems except as noted above.    MEDICATIONS  (STANDING):  aspirin enteric coated 81 milliGRAM(s) Oral daily  enoxaparin Injectable 70 milliGRAM(s) SubCutaneous every 12 hours  gabapentin 300 milliGRAM(s) Oral every 8 hours  influenza   Vaccine 0.5 milliLiter(s) IntraMuscular once  lidocaine   4% Patch 1 Patch Transdermal daily  pantoprazole    Tablet 40 milliGRAM(s) Oral daily  polyethylene glycol 3350 17 Gram(s) Oral two times a day  senna 2 Tablet(s) Oral at bedtime  sodium chloride 0.9% lock flush 3 milliLiter(s) IV Push every 8 hours    MEDICATIONS  (PRN):  methocarbamol 500 milliGRAM(s) Oral every 8 hours PRN Muscle Spasm  oxyCODONE    IR 10 milliGRAM(s) Oral every 4 hours PRN Severe Pain (7 - 10)  sorbitol 70% Solution 30 milliLiter(s) Oral once PRN constipation    Allergies: penicillin (Unknown)    Vitals   T(C): 36.9 (08 Dec 2021 21:21), Max: 37.2 (08 Dec 2021 05:45)  T(F): 98.5 (08 Dec 2021 21:21), Max: 99 (08 Dec 2021 05:45)  HR: 93 (08 Dec 2021 21:21) (78 - 93)  BP: 96/55 (08 Dec 2021 21:21) (92/54 - 126/64)  BP(mean): 84 (08 Dec 2021 15:38) (84 - 84)  RR: 18 (08 Dec 2021 21:21) (18 - 18)  SpO2: 96% (08 Dec 2021 21:21) (95% - 96%)    I&O's Detail    08 Dec 2021 07:01  -  09 Dec 2021 01:39  --------------------------------------------------------  IN:    Oral Fluid: 480 mL  Total IN: 480 mL    OUT:  Total OUT: 0 mL    Total NET: 480 mL    Physical Exam  Neuro: A+O x 3, non-focal, speech clear and intact  HEENT:  NCAT, No conjuctival edema or icterus, no thrush.    Neck:  Supple, trachea midline  Pulm: +Diminished BSs bilaterally, no accessory muscle use noted  CV: RRR, S1S2, +BARB  Abd: soft, NT, ND, + BS  Ext: BORDEN x 4, trace LE pitting edema b/l, no cyanosis or clubbing, distal motor/neuro/circ intact  Skin: warm, dry, perfused  Incisions: midsternal incision open to air C/D/I, sternum stable    LABS                        7.8    7.57  )-----------( 296      ( 08 Dec 2021 05:33 )             24.5     12-08    136  |  101  |  6.0<L>  ----------------------------<  107<H>  3.8   |  24.0  |  0.40<L>    Ca    8.8      08 Dec 2021 05:33  Mg     1.9     12-08    PT/INR - ( 08 Dec 2021 05:33 )   PT: 20.9 sec;   INR: 1.86 ratio        Last CXR:  < from: Xray Chest 1 View- PORTABLE-Routine (Xray Chest 1 View- PORTABLE-Routine in AM.) (21 @ 05:32) >  FINDINGS:   2021 chest available for review.  The lungs are clear of gross airspace consolidations or effusions. No pneumothorax.  The heart and mediastinum are within normal limits following cardiac  surgery.  Visualizedosseous structures are intact.  IMPRESSION:  Status post cardiac surgery.  No evidence of active chest pathology.  < end of copied text >

## 2021-12-09 NOTE — PROGRESS NOTE ADULT - PROBLEM SELECTOR PROBLEM 4
Need for prophylactic measure
Screening for substance abuse
Need for prophylactic measure
Screening for substance abuse

## 2021-12-09 NOTE — PROGRESS NOTE ADULT - PROVIDER SPECIALTY LIST ADULT
Anesthesia
Electrophysiology
CT Surgery
Electrophysiology
CT Surgery
Electrophysiology
CT Surgery

## 2021-12-09 NOTE — DISCHARGE NOTE NURSING/CASE MANAGEMENT/SOCIAL WORK - PATIENT PORTAL LINK FT
You can access the FollowMyHealth Patient Portal offered by [facility] by registering at the following website: [email or web address]. By joining [employer]’s FollowMyHealth portal, you will also be able to view your health information using other applications (apps) compatible with our system.

## 2021-12-09 NOTE — DISCHARGE NOTE NURSING/CASE MANAGEMENT/SOCIAL WORK - NSDCFUADDAPPT_GEN_ALL_CORE_FT
1. Follow up with Dr. Munguia on 12/22/21 1:45pm  The cardiac surgery office is located on the 1st floor [address] [place], [place].   Please call the CT Surgery office upon arrival to your appointment, you will then be instructed when to enter the building. This is being done due to COVID precautions.     Please arrive 45 mins early on the day of your appointment to have a chest xray (script is in your folder) taken on the first floor in radiology PRIOR to going to the CT surgery office.     2. Follow up with cardiology in 1-2 weeks.    -[facility] WILL BE AT YOUR HOME 12/12/21 FOR INR LAB DRAW

## 2021-12-09 NOTE — PROGRESS NOTE ADULT - SUBJECTIVE AND OBJECTIVE BOX
30 yo F   x2 LMP 10/2021 PMH of Congential Heart Disease, Bicuspid Aortic Valve, Murmur, and Severe Aortic Stenosis. At the age of 10 patient underwent an open aortic valvuloplasty under cardiopulmonary bypass on 2/15/2001. Patient is now experiencing intermittent episodes of near syncope and palpitations. She denies SOB but c/o occasional exertional dyspnea with some chest discomfort. She is a  and walks routinely throughout the day. She quit smoking 1-1/2 months ago. Preop assessment prior to reoperation sternotomy, aortic valve replacement, ascending hemiarch replacement w/Dr Munguia scheduled for 2021    Cardiac Summary:  - Echocardiogram 10/18/2021: bicuspid aortic valve bicuspid with severe stenosis. Aortic valve area 0.7 cm². Peak and mean gradients are 113 and 55 mmHg respectively. There is mild to moderate aortic valve regurgitation. The mitral valve is normal with minimal regurgitation. Biventricular function is preserved. The aortic root measures 3.5 cm and the ascending aorta measures 4.2. The aortic arch is measured at 3.6 cm.  - Cardiac catheterization 10/28/2021: The coronary anatomy is right dominant. The right coronary artery is normal but small, the left-sided circulation shows no disease. The valve was not crossed for the procedure and there were no gradients.  - CT angiogram 11/10/2021: The ascending aorta measures 4 cm the level of the main pulmonary artery. The aorta narrows down to 3.2 cm at the sinotubular junction. The aortic sinuses measure 3.0 x 2.6 cm and therefore not dilated. The descending thoracic aorta measures 2.1 cm in maximal diameter.    Above from H and P on (2021 15:14)  - Pt reports losing to follow up for many years till earlier this year when she saw a cardiologist for ZHAO and one presyncope episode. Found to have severe AS, and aortic now s/p mechanical AVR and hemiarch replacement (19 ON-X Valve, 23 hemashield) on 2021.   - Post op she has been in CHB with junctional escape and RBBB, VR in the 50s.   - CTS/CTICU team reported high epicardial wires threshold at 9 today, but she did not have any pacing requirements so far.   - She denies any palpitation, or syncope  - Social hx: remote weed use, ex smoker, stopped 2 months ago. no alcohol or drug abuse  - family hx: 2 kids, one with PV disease. The other has been screened and has no CHD. Mother and maternal GM had BAV  - Tele this morning (in 4 tower) showed occasional conducted beats with 1 st AVB   - she has been started on warfarin with low dose LMWH, INR still sub-theraputic     Interval Hx -12/3:  - Doing well, feeling better, one CT is out.   - Overnight rhythm was sinus with alternating CHB, second degree AVB. Now tele shows SR with 1st AVB and one to one conduction.   - EKG : SR, 1st AVB,  ms and LBBB,  ms.     Interval Hx 12/3-:   - Found sitting in bedside chair, states feeling better.  - Overnight rhythm is sinus rhythm with brief episode of 2nd AV block and Mobitz 1. Now NSR with 1:1 conduction.     Interval Hx -:  Doing well, remains in SR with LBBB and one to one AV conduction. Last episode of second degree AVB was on 12/3. EKG today: SR with normal WV, LBBB and  ms. She is anxious to go home. Attempted atrial pacing but she did not tolerate this due to phrenic stimulation.     interval Hx -  doing well, INR today 1.98. Tele continued to show 1:1 conduction with normal WV and narrower QRS today. Last EKG from   MEDICATIONS  (STANDING):  ascorbic acid 500 milliGRAM(s) Oral daily  aspirin enteric coated 81 milliGRAM(s) Oral daily  ferrous    sulfate 325 milliGRAM(s) Oral daily  folic acid 1 milliGRAM(s) Oral daily  gabapentin 300 milliGRAM(s) Oral every 8 hours  influenza   Vaccine 0.5 milliLiter(s) IntraMuscular once  lidocaine   4% Patch 1 Patch Transdermal daily  pantoprazole    Tablet 40 milliGRAM(s) Oral daily  polyethylene glycol 3350 17 Gram(s) Oral two times a day  senna 2 Tablet(s) Oral at bedtime  sodium chloride 0.9% lock flush 3 milliLiter(s) IV Push every 8 hours  warfarin 10 milliGRAM(s) Oral once    MEDICATIONS  (PRN):  methocarbamol 500 milliGRAM(s) Oral every 8 hours PRN Muscle Spasm  oxyCODONE    IR 5 milliGRAM(s) Oral every 4 hours PRN Moderate Pain (4 - 6)  oxyCODONE    IR 10 milliGRAM(s) Oral every 4 hours PRN Severe Pain (7 - 10)  sorbitol 70% Solution 30 milliLiter(s) Oral once PRN constipation      Allergies:  penicillin (Unknown)    PAST MEDICAL & SURGICAL HISTORY:  Thoracic aortic aneurysm, without rupture  Congenital stenosis of aortic valve  Congenital bicuspid aortic valve  History of open heart surgery   H/O  section 2017, 2019  H/O tubal ligation 2019    Vital Signs Last 24 Hrs  T(C): 37.3 (09 Dec 2021 05:00), Max: 37.3 (09 Dec 2021 05:00)  T(F): 99.1 (09 Dec 2021 05:00), Max: 99.1 (09 Dec 2021 05:00)  HR: 68 (09 Dec 2021 09:00) (68 - 93)  BP: 96/57 (09 Dec 2021 05:00) (96/55 - 126/64)  BP(mean): 84 (08 Dec 2021 15:38) (84 - 84)  RR: 18 (09 Dec 2021 05:00) (18 - 20)  SpO2: 97% (09 Dec 2021 05:00) (95% - 98%)    Physical Exam:  Constitutional: NAD, AAOx3  Cardiovascular: +S1S2 RRR  Pulmonary: CTA b/l, unlabored  GI: soft NTND   Extremities: no pedal edema,   Neuro: non focal, BORDEN x4    LABS:                            6.9    7.60  )-----------( 308      ( 09 Dec 2021 05:25 )             21.7       136  |  101  |  8.2  ----------------------------<  110<H>  4.0   |  23.0  |  0.44<L>    Ca    8.2<L>      09 Dec 2021 05:25  Phos  4.0       Mg     1.7         TPro  6.1<L>  /  Alb  3.1<L>  /  TBili  0.3<L>  /  DBili  x   /  AST  21  /  ALT  31  /  AlkPhos  64        RADIOLOGY & ADDITIONAL TESTS:  EKG 2021: SR, normal WV, LBBB  ms  EKG 12/3/2021: SR, 1st AVB,  ms, and LBBB with  ms  EKG 21 : SR with CHB and J. escape with RBBB, VR at 57 bpm.  ms  EKG 21 :  SR with CHB and J. escape with IVCD, VR at 55 bpm.  ms   EKG 21 16:02: SR with IVCD,  ms,   EKG 2021: SB, normal QRS/WV

## 2021-12-09 NOTE — PROGRESS NOTE ADULT - PROBLEM SELECTOR PROBLEM 2
Thoracic aortic aneurysm, without rupture

## 2021-12-10 ENCOUNTER — TRANSCRIPTION ENCOUNTER (OUTPATIENT)
Age: 31
End: 2021-12-10

## 2021-12-10 ENCOUNTER — APPOINTMENT (OUTPATIENT)
Dept: CARE COORDINATION | Facility: HOME HEALTH | Age: 31
End: 2021-12-10

## 2021-12-10 NOTE — HISTORY OF PRESENT ILLNESS
[FreeTextEntry1] : FOLLOW YOUR HEART\par \par Hospital Course	 \par 31F, former smoker, ,  x 2, with a medical history of \par Congential Heart Disease, Bicuspid Aortic Valve, Murmur, Severe Aortic Stenosis \par s/p open aortic valvuloplasty under cardiopulmonary bypass on 2/15/2001, now \par with c/o episodes of near syncope, ZHAO, chest discomfort, and palpitations, s/p \par AVR (#19 On-X mechanical valve) and replacement of ascending aorta (hemiarch \par with #24 Gelweave graft) on 21 with Dr. Munguia. Postoperative course \par significant for complete heart block, resolved, PW cut at skin on discharge. pt \par to follow up with EP for MCOT x 1 week on discharge.  On day of discharge H/H \par noted to be 6.9/21.7, orthostatics negative, patient supplemented with 1 dose \par of epogen per Dr. Munguia and iron/folic acid/vitC. Echo per Dr. Munguia \par with trivial effusion and trivial PVL. \par Pt remains hemodynamically stable, ambulating well, and is stable for discharge \par as per Dr. Munguia. \par d/c'd home on coumadin for her mechanical valve. pt will have INR checks M/ \par to be faxed to her cardiology Dr. Yao for monitoring. \par \par Multiple attempts made to connect via telehealth, no answer pt side\par Subsequent telephone call placed, going straight to

## 2021-12-14 LAB — SURGICAL PATHOLOGY STUDY: SIGNIFICANT CHANGE UP

## 2021-12-15 LAB
INR PPP: 7.78 RATIO
QUALITY CONTROL: YES

## 2021-12-16 ENCOUNTER — APPOINTMENT (OUTPATIENT)
Dept: CARDIOLOGY | Facility: CLINIC | Age: 31
End: 2021-12-16

## 2021-12-16 DIAGNOSIS — I45.9 CONDUCTION DISORDER, UNSPECIFIED: ICD-10-CM

## 2021-12-16 DIAGNOSIS — Z86.79 PERSONAL HISTORY OF OTHER DISEASES OF THE CIRCULATORY SYSTEM: ICD-10-CM

## 2021-12-16 DIAGNOSIS — Z87.898 PERSONAL HISTORY OF OTHER SPECIFIED CONDITIONS: ICD-10-CM

## 2021-12-16 LAB
INR PPP: 3 RATIO
QUALITY CONTROL: YES

## 2021-12-16 PROCEDURE — ZZZZZ: CPT | Mod: 1L

## 2021-12-16 RX ORDER — ASPIRIN ENTERIC COATED TABLETS 81 MG 81 MG/1
81 TABLET, DELAYED RELEASE ORAL
Qty: 90 | Refills: 0 | Status: ACTIVE | COMMUNITY
Start: 2021-12-16

## 2021-12-20 ENCOUNTER — APPOINTMENT (OUTPATIENT)
Dept: ELECTROPHYSIOLOGY | Facility: CLINIC | Age: 31
End: 2021-12-20

## 2021-12-20 ENCOUNTER — APPOINTMENT (OUTPATIENT)
Dept: CARDIOLOGY | Facility: CLINIC | Age: 31
End: 2021-12-20

## 2021-12-20 PROBLEM — Q24.9 CONGENITAL HEART DISEASE: Status: ACTIVE | Noted: 2021-12-16

## 2021-12-20 PROBLEM — I44.2 COMPLETE HEART BLOCK, TRANSIENT: Status: ACTIVE | Noted: 2021-12-09

## 2021-12-20 LAB — INR PPP: 2.3 RATIO

## 2021-12-20 PROCEDURE — ZZZZZ: CPT | Mod: 1L

## 2021-12-22 ENCOUNTER — APPOINTMENT (OUTPATIENT)
Dept: CARDIOTHORACIC SURGERY | Facility: CLINIC | Age: 31
End: 2021-12-22
Payer: COMMERCIAL

## 2021-12-22 ENCOUNTER — RESULT REVIEW (OUTPATIENT)
Age: 31
End: 2021-12-22

## 2021-12-22 ENCOUNTER — OUTPATIENT (OUTPATIENT)
Dept: OUTPATIENT SERVICES | Facility: HOSPITAL | Age: 31
LOS: 1 days | End: 2021-12-22
Payer: COMMERCIAL

## 2021-12-22 VITALS
HEIGHT: 62 IN | SYSTOLIC BLOOD PRESSURE: 99 MMHG | DIASTOLIC BLOOD PRESSURE: 62 MMHG | WEIGHT: 136 LBS | HEART RATE: 78 BPM | BODY MASS INDEX: 25.03 KG/M2 | RESPIRATION RATE: 16 BRPM | OXYGEN SATURATION: 99 %

## 2021-12-22 DIAGNOSIS — Z98.891 HISTORY OF UTERINE SCAR FROM PREVIOUS SURGERY: Chronic | ICD-10-CM

## 2021-12-22 DIAGNOSIS — Z98.890 OTHER SPECIFIED POSTPROCEDURAL STATES: Chronic | ICD-10-CM

## 2021-12-22 DIAGNOSIS — I44.2 ATRIOVENTRICULAR BLOCK, COMPLETE: ICD-10-CM

## 2021-12-22 DIAGNOSIS — J90 PLEURAL EFFUSION, NOT ELSEWHERE CLASSIFIED: ICD-10-CM

## 2021-12-22 DIAGNOSIS — Q24.9 CONGENITAL MALFORMATION OF HEART, UNSPECIFIED: ICD-10-CM

## 2021-12-22 DIAGNOSIS — Z98.51 TUBAL LIGATION STATUS: Chronic | ICD-10-CM

## 2021-12-22 PROCEDURE — 99024 POSTOP FOLLOW-UP VISIT: CPT

## 2021-12-22 PROCEDURE — 71046 X-RAY EXAM CHEST 2 VIEWS: CPT

## 2021-12-22 PROCEDURE — 71046 X-RAY EXAM CHEST 2 VIEWS: CPT | Mod: 26

## 2021-12-27 LAB
INR PPP: 2.25 RATIO
QUALITY CONTROL: YES

## 2021-12-29 ENCOUNTER — APPOINTMENT (OUTPATIENT)
Dept: CARDIOLOGY | Facility: CLINIC | Age: 31
End: 2021-12-29

## 2021-12-29 ENCOUNTER — NON-APPOINTMENT (OUTPATIENT)
Age: 31
End: 2021-12-29

## 2021-12-29 LAB
INR PPP: 1.6 RATIO
QUALITY CONTROL: YES

## 2021-12-29 PROCEDURE — ZZZZZ: CPT | Mod: 1L

## 2021-12-30 ENCOUNTER — OUTPATIENT (OUTPATIENT)
Dept: OUTPATIENT SERVICES | Facility: HOSPITAL | Age: 31
LOS: 1 days | End: 2021-12-30

## 2021-12-30 DIAGNOSIS — Z98.891 HISTORY OF UTERINE SCAR FROM PREVIOUS SURGERY: Chronic | ICD-10-CM

## 2021-12-30 DIAGNOSIS — Z98.890 OTHER SPECIFIED POSTPROCEDURAL STATES: Chronic | ICD-10-CM

## 2021-12-30 DIAGNOSIS — Z98.51 TUBAL LIGATION STATUS: Chronic | ICD-10-CM

## 2022-01-05 ENCOUNTER — NON-APPOINTMENT (OUTPATIENT)
Age: 32
End: 2022-01-05

## 2022-01-05 ENCOUNTER — APPOINTMENT (OUTPATIENT)
Dept: CARDIOLOGY | Facility: CLINIC | Age: 32
End: 2022-01-05

## 2022-01-05 LAB
INR PPP: 2.1 RATIO
QUALITY CONTROL: YES

## 2022-01-05 PROCEDURE — ZZZZZ: CPT | Mod: 1L

## 2022-01-06 ENCOUNTER — TRANSCRIPTION ENCOUNTER (OUTPATIENT)
Age: 32
End: 2022-01-06

## 2022-01-12 ENCOUNTER — APPOINTMENT (OUTPATIENT)
Dept: CARDIOLOGY | Facility: CLINIC | Age: 32
End: 2022-01-12

## 2022-01-12 LAB
INR PPP: 2.2 RATIO
QUALITY CONTROL: YES

## 2022-01-12 PROCEDURE — ZZZZZ: CPT | Mod: 1L

## 2022-01-19 ENCOUNTER — APPOINTMENT (OUTPATIENT)
Dept: CARDIOLOGY | Facility: CLINIC | Age: 32
End: 2022-01-19

## 2022-01-28 ENCOUNTER — APPOINTMENT (OUTPATIENT)
Dept: CARDIOLOGY | Facility: CLINIC | Age: 32
End: 2022-01-28

## 2022-02-11 ENCOUNTER — RX CHANGE (OUTPATIENT)
Age: 32
End: 2022-02-11

## 2022-02-11 ENCOUNTER — APPOINTMENT (OUTPATIENT)
Dept: CARDIOLOGY | Facility: CLINIC | Age: 32
End: 2022-02-11
Payer: COMMERCIAL

## 2022-02-11 VITALS
TEMPERATURE: 97.7 F | OXYGEN SATURATION: 99 % | BODY MASS INDEX: 25.76 KG/M2 | HEART RATE: 74 BPM | HEIGHT: 62 IN | SYSTOLIC BLOOD PRESSURE: 104 MMHG | DIASTOLIC BLOOD PRESSURE: 54 MMHG | WEIGHT: 140 LBS

## 2022-02-11 LAB
INR PPP: 1.3 RATIO
QUALITY CONTROL: YES

## 2022-02-11 PROCEDURE — 99213 OFFICE O/P EST LOW 20 MIN: CPT

## 2022-02-11 RX ORDER — FOLIC ACID 1 MG/1
1 TABLET ORAL DAILY
Qty: 30 | Refills: 3 | Status: DISCONTINUED | COMMUNITY
Start: 2021-12-16 | End: 2022-02-11

## 2022-02-11 RX ORDER — SENNOSIDES 8.6 MG TABLETS 8.6 MG/1
8.6 TABLET ORAL
Refills: 0 | Status: DISCONTINUED | COMMUNITY
Start: 2021-12-16 | End: 2022-02-11

## 2022-02-11 RX ORDER — MUPIROCIN 20 MG/G
2 OINTMENT TOPICAL TWICE DAILY
Qty: 1 | Refills: 0 | Status: DISCONTINUED | COMMUNITY
Start: 2021-11-19 | End: 2022-02-11

## 2022-02-11 RX ORDER — MULTIVIT-MIN/FOLIC/VIT K/LYCOP 400-300MCG
500 TABLET ORAL DAILY
Refills: 0 | Status: DISCONTINUED | COMMUNITY
Start: 2021-12-16 | End: 2022-02-11

## 2022-02-13 ENCOUNTER — NON-APPOINTMENT (OUTPATIENT)
Age: 32
End: 2022-02-13

## 2022-02-13 ENCOUNTER — RX CHANGE (OUTPATIENT)
Age: 32
End: 2022-02-13

## 2022-02-15 ENCOUNTER — LABORATORY RESULT (OUTPATIENT)
Age: 32
End: 2022-02-15

## 2022-02-15 ENCOUNTER — APPOINTMENT (OUTPATIENT)
Dept: CARDIOLOGY | Facility: CLINIC | Age: 32
End: 2022-02-15

## 2022-02-15 ENCOUNTER — APPOINTMENT (OUTPATIENT)
Dept: FAMILY MEDICINE | Facility: CLINIC | Age: 32
End: 2022-02-15
Payer: COMMERCIAL

## 2022-02-15 LAB
INR PPP: 1.5 RATIO
QUALITY CONTROL: YES

## 2022-02-15 PROCEDURE — 36415 COLL VENOUS BLD VENIPUNCTURE: CPT

## 2022-02-15 PROCEDURE — ZZZZZ: CPT | Mod: 1L

## 2022-02-17 ENCOUNTER — APPOINTMENT (OUTPATIENT)
Dept: CARDIOLOGY | Facility: CLINIC | Age: 32
End: 2022-02-17

## 2022-02-17 LAB
INR PPP: 1.6 RATIO
QUALITY CONTROL: YES

## 2022-02-17 PROCEDURE — ZZZZZ: CPT | Mod: 1L

## 2022-02-18 ENCOUNTER — APPOINTMENT (OUTPATIENT)
Dept: CARDIOLOGY | Facility: CLINIC | Age: 32
End: 2022-02-18

## 2022-02-18 PROCEDURE — ZZZZZ: CPT | Mod: 1L

## 2022-02-20 LAB
INR PPP: 2.1 RATIO
QUALITY CONTROL: YES

## 2022-02-21 RX ORDER — CHLORHEXIDINE GLUCONATE 4 %
325 (65 FE) LIQUID (ML) TOPICAL DAILY
Qty: 90 | Refills: 1 | Status: ACTIVE | COMMUNITY
Start: 2021-12-16 | End: 1900-01-01

## 2022-02-22 ENCOUNTER — APPOINTMENT (OUTPATIENT)
Dept: CARDIOLOGY | Facility: CLINIC | Age: 32
End: 2022-02-22

## 2022-02-22 LAB
INR PPP: 2.7 RATIO
QUALITY CONTROL: YES

## 2022-02-22 PROCEDURE — ZZZZZ: CPT | Mod: 1L

## 2022-02-24 NOTE — PROGRESS NOTE ADULT - PROBLEM SELECTOR PLAN 1
preop assessment, reoperation sternotomy, aortic valve replacement, ascending hemiarch replacement w/Dr Munguia scheduled for 11/30/2021 Double O-Z Flap Text: The defect edges were debeveled with a #15 scalpel blade.  Given the location of the defect, shape of the defect and the proximity to free margins a Double O-Z flap was deemed most appropriate.  Using a sterile surgical marker, an appropriate transposition flap was drawn incorporating the defect and placing the expected incisions within the relaxed skin tension lines where possible. The area thus outlined was incised deep to adipose tissue with a #15 scalpel blade.  The skin margins were undermined to an appropriate distance in all directions utilizing iris scissors.

## 2022-03-02 ENCOUNTER — APPOINTMENT (OUTPATIENT)
Dept: CARDIOLOGY | Facility: CLINIC | Age: 32
End: 2022-03-02

## 2022-03-02 LAB
INR PPP: 2.7 RATIO
QUALITY CONTROL: YES

## 2022-03-02 PROCEDURE — ZZZZZ: CPT | Mod: 1L

## 2022-03-15 ENCOUNTER — APPOINTMENT (OUTPATIENT)
Dept: CARDIOLOGY | Facility: CLINIC | Age: 32
End: 2022-03-15

## 2022-03-15 LAB
INR PPP: 2.2 RATIO
QUALITY CONTROL: YES

## 2022-03-15 PROCEDURE — ZZZZZ: CPT | Mod: 1L

## 2022-03-31 ENCOUNTER — APPOINTMENT (OUTPATIENT)
Dept: CARDIOLOGY | Facility: CLINIC | Age: 32
End: 2022-03-31

## 2022-03-31 ENCOUNTER — NON-APPOINTMENT (OUTPATIENT)
Age: 32
End: 2022-03-31

## 2022-03-31 ENCOUNTER — APPOINTMENT (OUTPATIENT)
Dept: CARDIOLOGY | Facility: CLINIC | Age: 32
End: 2022-03-31
Payer: COMMERCIAL

## 2022-03-31 DIAGNOSIS — R00.2 PALPITATIONS: ICD-10-CM

## 2022-03-31 LAB
INR PPP: 2.7 RATIO
QUALITY CONTROL: YES

## 2022-03-31 PROCEDURE — ZZZZZ: CPT | Mod: 1L

## 2022-03-31 PROCEDURE — 93242 EXT ECG>48HR<7D RECORDING: CPT

## 2022-04-15 PROCEDURE — 93244 EXT ECG>48HR<7D REV&INTERPJ: CPT

## 2022-04-28 ENCOUNTER — APPOINTMENT (OUTPATIENT)
Dept: CARDIOLOGY | Facility: CLINIC | Age: 32
End: 2022-04-28

## 2022-04-28 LAB
INR PPP: 2.9 RATIO
QUALITY CONTROL: YES

## 2022-04-28 PROCEDURE — ZZZZZ: CPT | Mod: 1L

## 2022-05-13 ENCOUNTER — APPOINTMENT (OUTPATIENT)
Dept: CARDIOLOGY | Facility: CLINIC | Age: 32
End: 2022-05-13
Payer: COMMERCIAL

## 2022-05-13 VITALS
HEART RATE: 63 BPM | HEIGHT: 62 IN | OXYGEN SATURATION: 99 % | WEIGHT: 148 LBS | BODY MASS INDEX: 27.23 KG/M2 | SYSTOLIC BLOOD PRESSURE: 120 MMHG | DIASTOLIC BLOOD PRESSURE: 70 MMHG | TEMPERATURE: 97.5 F

## 2022-05-13 PROCEDURE — 99213 OFFICE O/P EST LOW 20 MIN: CPT

## 2022-05-13 RX ORDER — ENOXAPARIN SODIUM 100 MG/ML
60 INJECTION SUBCUTANEOUS
Qty: 14 | Refills: 0 | Status: DISCONTINUED | COMMUNITY
Start: 2022-02-11 | End: 2022-05-13

## 2022-05-26 ENCOUNTER — APPOINTMENT (OUTPATIENT)
Dept: CARDIOLOGY | Facility: CLINIC | Age: 32
End: 2022-05-26

## 2022-05-26 LAB
INR PPP: 4 RATIO
QUALITY CONTROL: YES

## 2022-05-26 PROCEDURE — 85610 PROTHROMBIN TIME: CPT | Mod: QW

## 2022-05-26 PROCEDURE — 93793 ANTICOAG MGMT PT WARFARIN: CPT

## 2022-06-01 ENCOUNTER — APPOINTMENT (OUTPATIENT)
Dept: CARDIOLOGY | Facility: CLINIC | Age: 32
End: 2022-06-01

## 2022-06-01 LAB
INR PPP: 2 RATIO
QUALITY CONTROL: YES

## 2022-06-01 PROCEDURE — 85610 PROTHROMBIN TIME: CPT | Mod: QW

## 2022-06-01 PROCEDURE — 93793 ANTICOAG MGMT PT WARFARIN: CPT

## 2022-06-16 ENCOUNTER — APPOINTMENT (OUTPATIENT)
Dept: CARDIOLOGY | Facility: CLINIC | Age: 32
End: 2022-06-16

## 2022-06-16 LAB
INR PPP: 2.6 RATIO
QUALITY CONTROL: YES

## 2022-06-16 PROCEDURE — 85610 PROTHROMBIN TIME: CPT | Mod: QW

## 2022-06-16 PROCEDURE — 93793 ANTICOAG MGMT PT WARFARIN: CPT

## 2022-06-23 ENCOUNTER — APPOINTMENT (OUTPATIENT)
Dept: CARDIOLOGY | Facility: CLINIC | Age: 32
End: 2022-06-23

## 2022-06-23 LAB
INR PPP: 3.3 RATIO
QUALITY CONTROL: YES

## 2022-06-23 PROCEDURE — 93793 ANTICOAG MGMT PT WARFARIN: CPT

## 2022-06-23 PROCEDURE — 85610 PROTHROMBIN TIME: CPT | Mod: QW

## 2022-07-07 ENCOUNTER — APPOINTMENT (OUTPATIENT)
Dept: CARDIOLOGY | Facility: CLINIC | Age: 32
End: 2022-07-07

## 2022-07-07 LAB
INR PPP: 3.9 RATIO
QUALITY CONTROL: YES

## 2022-07-07 PROCEDURE — 85610 PROTHROMBIN TIME: CPT | Mod: QW

## 2022-07-07 PROCEDURE — 93793 ANTICOAG MGMT PT WARFARIN: CPT

## 2022-07-14 ENCOUNTER — APPOINTMENT (OUTPATIENT)
Dept: CARDIOLOGY | Facility: CLINIC | Age: 32
End: 2022-07-14

## 2022-07-14 LAB
INR PPP: 1.9 RATIO
QUALITY CONTROL: YES

## 2022-07-14 PROCEDURE — 85610 PROTHROMBIN TIME: CPT | Mod: QW

## 2022-07-14 PROCEDURE — 93793 ANTICOAG MGMT PT WARFARIN: CPT

## 2022-07-21 ENCOUNTER — APPOINTMENT (OUTPATIENT)
Dept: CARDIOLOGY | Facility: CLINIC | Age: 32
End: 2022-07-21

## 2022-07-21 LAB
INR PPP: 1.9 RATIO
QUALITY CONTROL: YES

## 2022-07-21 PROCEDURE — 85610 PROTHROMBIN TIME: CPT | Mod: QW

## 2022-07-21 PROCEDURE — 93793 ANTICOAG MGMT PT WARFARIN: CPT

## 2022-07-28 ENCOUNTER — APPOINTMENT (OUTPATIENT)
Dept: CARDIOLOGY | Facility: CLINIC | Age: 32
End: 2022-07-28

## 2022-08-11 ENCOUNTER — APPOINTMENT (OUTPATIENT)
Dept: CARDIOLOGY | Facility: CLINIC | Age: 32
End: 2022-08-11

## 2022-08-11 LAB
INR PPP: 3.3 RATIO
QUALITY CONTROL: YES

## 2022-08-11 PROCEDURE — 93793 ANTICOAG MGMT PT WARFARIN: CPT

## 2022-08-11 PROCEDURE — 85610 PROTHROMBIN TIME: CPT | Mod: QW

## 2022-08-18 ENCOUNTER — APPOINTMENT (OUTPATIENT)
Dept: CARDIOLOGY | Facility: CLINIC | Age: 32
End: 2022-08-18

## 2022-08-18 LAB
INR PPP: 2.6 RATIO
QUALITY CONTROL: YES

## 2022-08-18 PROCEDURE — 93793 ANTICOAG MGMT PT WARFARIN: CPT

## 2022-08-18 PROCEDURE — 85610 PROTHROMBIN TIME: CPT | Mod: QW

## 2022-09-01 ENCOUNTER — APPOINTMENT (OUTPATIENT)
Dept: CARDIOLOGY | Facility: CLINIC | Age: 32
End: 2022-09-01

## 2022-11-15 NOTE — H&P PST ADULT - NEGATIVE CARDIOVASCULAR SYMPTOMS
PSR alerted writer that patient self scheduled herself for a OV for 12/15 for ear pain.    Attempted to call patient and triage. No answer. Left message to call back.   
no orthopnea/no paroxysmal nocturnal dyspnea/no peripheral edema/no claudication

## 2022-12-01 ENCOUNTER — APPOINTMENT (OUTPATIENT)
Dept: CARDIOLOGY | Facility: CLINIC | Age: 32
End: 2022-12-01

## 2022-12-01 LAB
INR PPP: 1.5 RATIO
QUALITY CONTROL: YES

## 2022-12-01 PROCEDURE — 85610 PROTHROMBIN TIME: CPT | Mod: QW

## 2022-12-01 PROCEDURE — 93793 ANTICOAG MGMT PT WARFARIN: CPT

## 2022-12-05 ENCOUNTER — APPOINTMENT (OUTPATIENT)
Dept: CARDIOLOGY | Facility: CLINIC | Age: 32
End: 2022-12-05

## 2022-12-05 ENCOUNTER — NON-APPOINTMENT (OUTPATIENT)
Age: 32
End: 2022-12-05

## 2022-12-05 VITALS
HEART RATE: 77 BPM | SYSTOLIC BLOOD PRESSURE: 102 MMHG | OXYGEN SATURATION: 99 % | WEIGHT: 145 LBS | HEIGHT: 62 IN | BODY MASS INDEX: 26.68 KG/M2 | DIASTOLIC BLOOD PRESSURE: 54 MMHG | TEMPERATURE: 97.7 F

## 2022-12-05 PROCEDURE — 93000 ELECTROCARDIOGRAM COMPLETE: CPT

## 2022-12-05 PROCEDURE — 99214 OFFICE O/P EST MOD 30 MIN: CPT | Mod: 25

## 2022-12-08 ENCOUNTER — APPOINTMENT (OUTPATIENT)
Dept: CARDIOLOGY | Facility: CLINIC | Age: 32
End: 2022-12-08

## 2022-12-08 LAB
INR PPP: 3.4 RATIO
QUALITY CONTROL: YES

## 2022-12-08 PROCEDURE — 85610 PROTHROMBIN TIME: CPT | Mod: QW

## 2022-12-08 PROCEDURE — 93793 ANTICOAG MGMT PT WARFARIN: CPT

## 2022-12-15 ENCOUNTER — APPOINTMENT (OUTPATIENT)
Dept: CARDIOLOGY | Facility: CLINIC | Age: 32
End: 2022-12-15

## 2022-12-15 LAB
INR PPP: 2 RATIO
QUALITY CONTROL: YES

## 2022-12-15 PROCEDURE — 85610 PROTHROMBIN TIME: CPT | Mod: QW

## 2022-12-15 PROCEDURE — 93793 ANTICOAG MGMT PT WARFARIN: CPT

## 2022-12-29 ENCOUNTER — APPOINTMENT (OUTPATIENT)
Dept: CARDIOLOGY | Facility: CLINIC | Age: 32
End: 2022-12-29

## 2023-01-03 ENCOUNTER — OFFICE (OUTPATIENT)
Dept: URBAN - METROPOLITAN AREA CLINIC 38 | Facility: CLINIC | Age: 33
Setting detail: OPHTHALMOLOGY
End: 2023-01-03
Payer: COMMERCIAL

## 2023-01-03 DIAGNOSIS — H52.03: ICD-10-CM

## 2023-01-03 DIAGNOSIS — H16.223: ICD-10-CM

## 2023-01-03 PROCEDURE — 92015 DETERMINE REFRACTIVE STATE: CPT | Performed by: OPHTHALMOLOGY

## 2023-01-03 PROCEDURE — 92004 COMPRE OPH EXAM NEW PT 1/>: CPT | Performed by: OPHTHALMOLOGY

## 2023-01-03 ASSESSMENT — REFRACTION_MANIFEST
OD_SPHERE: PLANO
OD_AXIS: 095
OS_CYLINDER: -1.00
OD_VA1: 20/20-2
OU_VA: 20/20-
OD_VA1: 20/20-2
OS_SPHERE: +0.25
OS_SPHERE: +0.25
OS_AXIS: 085
OU_VA: 20/20-
OS_VA1: 20/20-
OD_CYLINDER: -1.00
OS_CYLINDER: -1.00
OD_AXIS: 095
OD_SPHERE: PLANO
OS_AXIS: 085
OS_VA1: 20/20-
OD_CYLINDER: -1.00

## 2023-01-03 ASSESSMENT — REFRACTION_AUTOREFRACTION
OS_AXIS: 085
OS_SPHERE: +1.50
OD_AXIS: 097
OD_CYLINDER: -1.50
OD_SPHERE: +1.00
OS_CYLINDER: -1.75

## 2023-01-03 ASSESSMENT — SPHEQUIV_DERIVED
OS_SPHEQUIV: -0.25
OS_SPHEQUIV: 0.625
OS_SPHEQUIV: -0.25
OD_SPHEQUIV: 0.25

## 2023-01-03 ASSESSMENT — CONFRONTATIONAL VISUAL FIELD TEST (CVF)
OS_FINDINGS: FULL
OD_FINDINGS: FULL

## 2023-01-03 ASSESSMENT — TONOMETRY
OS_IOP_MMHG: 18
OD_IOP_MMHG: 16

## 2023-01-03 ASSESSMENT — KERATOMETRY
OD_AXISANGLE_DEGREES: 014
OD_K1POWER_DIOPTERS: 42.75
OD_K2POWER_DIOPTERS: 44.00
OS_AXISANGLE_DEGREES: 177
OS_K1POWER_DIOPTERS: 42.75
OS_K2POWER_DIOPTERS: 44.00

## 2023-01-03 ASSESSMENT — VISUAL ACUITY
OD_BCVA: 20/30-
OS_BCVA: 20/30

## 2023-01-03 ASSESSMENT — AXIALLENGTH_DERIVED
OS_AL: 23.396
OS_AL: 23.7361
OD_AL: 23.5405
OS_AL: 23.7361

## 2023-01-12 ENCOUNTER — APPOINTMENT (OUTPATIENT)
Dept: CARDIOLOGY | Facility: CLINIC | Age: 33
End: 2023-01-12
Payer: COMMERCIAL

## 2023-01-12 LAB
INR PPP: 3.1 RATIO
QUALITY CONTROL: YES

## 2023-01-12 PROCEDURE — 93793 ANTICOAG MGMT PT WARFARIN: CPT

## 2023-01-12 PROCEDURE — 85610 PROTHROMBIN TIME: CPT | Mod: QW

## 2023-01-19 ENCOUNTER — APPOINTMENT (OUTPATIENT)
Dept: CARDIOLOGY | Facility: CLINIC | Age: 33
End: 2023-01-19
Payer: COMMERCIAL

## 2023-01-19 LAB
INR PPP: 1.5 RATIO
QUALITY CONTROL: YES

## 2023-01-19 PROCEDURE — 85610 PROTHROMBIN TIME: CPT | Mod: QW

## 2023-01-19 PROCEDURE — 93793 ANTICOAG MGMT PT WARFARIN: CPT

## 2023-02-02 ENCOUNTER — APPOINTMENT (OUTPATIENT)
Dept: CARDIOLOGY | Facility: CLINIC | Age: 33
End: 2023-02-02
Payer: COMMERCIAL

## 2023-02-02 LAB
INR PPP: 2.6 RATIO
QUALITY CONTROL: YES

## 2023-02-02 PROCEDURE — 85610 PROTHROMBIN TIME: CPT | Mod: QW

## 2023-02-02 PROCEDURE — 93793 ANTICOAG MGMT PT WARFARIN: CPT

## 2023-03-02 ENCOUNTER — APPOINTMENT (OUTPATIENT)
Dept: CARDIOLOGY | Facility: CLINIC | Age: 33
End: 2023-03-02
Payer: COMMERCIAL

## 2023-03-02 LAB
INR PPP: 2.8 RATIO
QUALITY CONTROL: YES

## 2023-03-02 PROCEDURE — 85610 PROTHROMBIN TIME: CPT | Mod: QW

## 2023-03-02 PROCEDURE — 93793 ANTICOAG MGMT PT WARFARIN: CPT

## 2023-03-23 ENCOUNTER — APPOINTMENT (OUTPATIENT)
Dept: CARDIOLOGY | Facility: CLINIC | Age: 33
End: 2023-03-23

## 2023-05-11 ENCOUNTER — APPOINTMENT (OUTPATIENT)
Dept: CARDIOLOGY | Facility: CLINIC | Age: 33
End: 2023-05-11

## 2023-05-18 ENCOUNTER — APPOINTMENT (OUTPATIENT)
Dept: CARDIOLOGY | Facility: CLINIC | Age: 33
End: 2023-05-18

## 2023-06-22 ENCOUNTER — APPOINTMENT (OUTPATIENT)
Dept: CARDIOLOGY | Facility: CLINIC | Age: 33
End: 2023-06-22
Payer: COMMERCIAL

## 2023-06-22 LAB
INR PPP: 2.3 RATIO
QUALITY CONTROL: YES

## 2023-06-22 PROCEDURE — 85610 PROTHROMBIN TIME: CPT | Mod: QW

## 2023-06-22 PROCEDURE — 93793 ANTICOAG MGMT PT WARFARIN: CPT

## 2023-07-13 ENCOUNTER — APPOINTMENT (OUTPATIENT)
Dept: CARDIOLOGY | Facility: CLINIC | Age: 33
End: 2023-07-13

## 2023-08-31 ENCOUNTER — APPOINTMENT (OUTPATIENT)
Dept: CARDIOLOGY | Facility: CLINIC | Age: 33
End: 2023-08-31
Payer: COMMERCIAL

## 2023-08-31 LAB
INR PPP: 5.3 RATIO
QUALITY CONTROL: YES

## 2023-08-31 PROCEDURE — 85610 PROTHROMBIN TIME: CPT | Mod: QW

## 2023-08-31 PROCEDURE — 93793 ANTICOAG MGMT PT WARFARIN: CPT

## 2023-09-08 ENCOUNTER — APPOINTMENT (OUTPATIENT)
Dept: CARDIOLOGY | Facility: CLINIC | Age: 33
End: 2023-09-08

## 2023-10-23 ENCOUNTER — APPOINTMENT (OUTPATIENT)
Dept: CARDIOLOGY | Facility: CLINIC | Age: 33
End: 2023-10-23

## 2023-11-01 ENCOUNTER — NON-APPOINTMENT (OUTPATIENT)
Age: 33
End: 2023-11-01

## 2023-11-16 RX ORDER — WARFARIN 5 MG/1
5 TABLET ORAL
Qty: 180 | Refills: 3 | Status: ACTIVE | COMMUNITY
Start: 2021-12-16 | End: 1900-01-01

## 2023-12-07 ENCOUNTER — APPOINTMENT (OUTPATIENT)
Dept: CARDIOLOGY | Facility: CLINIC | Age: 33
End: 2023-12-07

## 2024-02-22 ENCOUNTER — NON-APPOINTMENT (OUTPATIENT)
Age: 34
End: 2024-02-22

## 2024-02-22 ENCOUNTER — APPOINTMENT (OUTPATIENT)
Dept: CARDIOLOGY | Facility: CLINIC | Age: 34
End: 2024-02-22
Payer: COMMERCIAL

## 2024-02-22 VITALS
HEART RATE: 75 BPM | SYSTOLIC BLOOD PRESSURE: 124 MMHG | OXYGEN SATURATION: 99 % | WEIGHT: 149 LBS | DIASTOLIC BLOOD PRESSURE: 76 MMHG | BODY MASS INDEX: 27.25 KG/M2

## 2024-02-22 DIAGNOSIS — Z79.01 LONG TERM (CURRENT) USE OF ANTICOAGULANTS: ICD-10-CM

## 2024-02-22 DIAGNOSIS — Q23.1 CONGENITAL STENOSIS OF AORTIC VALVE: ICD-10-CM

## 2024-02-22 DIAGNOSIS — Z95.2 PRESENCE OF PROSTHETIC HEART VALVE: ICD-10-CM

## 2024-02-22 DIAGNOSIS — Z01.818 ENCOUNTER FOR OTHER PREPROCEDURAL EXAMINATION: ICD-10-CM

## 2024-02-22 DIAGNOSIS — Q23.0 CONGENITAL STENOSIS OF AORTIC VALVE: ICD-10-CM

## 2024-02-22 LAB
INR PPP: 3.3 RATIO
QUALITY CONTROL: YES

## 2024-02-22 PROCEDURE — 93000 ELECTROCARDIOGRAM COMPLETE: CPT

## 2024-02-22 PROCEDURE — 99215 OFFICE O/P EST HI 40 MIN: CPT | Mod: 25

## 2024-02-22 PROCEDURE — 85610 PROTHROMBIN TIME: CPT | Mod: QW

## 2024-02-22 NOTE — HISTORY OF PRESENT ILLNESS
[FreeTextEntry1] : 34 yo F with history of severe aortic stenosis s/p MVR and ascending arch repair (ON-X 19) 11/30/21 at [facility] by Dr. Munguia that was c/b complete heart block presents today for follow-up.  She was last seen in our office on 12/5/2022 by Dr. Palm.   Since then, patient has not been compliant with office visits for INR check - last checked was 5.2 on 8/31/23. Multiple attempts by office staff have been made to reach out to her and schedule appointments.  Notes that she is currently taking Coumadin 7.5 mg every day however she will often miss her doses.  If she does miss a dose, the next day she will take 10 mg of Coumadin.  She currently endorses heavy menses.  She has easy bruising.  She denies any bright red blood per rectum or dark stools.  She is no longer on iron supplements.  She has 2 kids and had a tubal ligation prior to her aortic valve replacement.  No plans for further pregnancies.

## 2024-02-22 NOTE — DISCUSSION/SUMMARY
[FreeTextEntry1] : #Mechanical aortic valve - On-X 19 valve.   - AC: coumadin + ASA 81 mg daily. -INR target for her has been historically kept at 2-2.5.  She has had no major bleeding episodes at this level however, with the On-X valve, can target a lower INR in the 1.5-2 range.  Would consider INR range reduction if she has any bleeding.  -Will need full set of labs including CBC, CMP, lipid panel. -Due to noncompliance with INRs, we discussed several strategies to make INR monitoring easier. -Patient notes difficulty getting to appointments due to transportation issues. -Patient is uncomfortable with a nursing driven home blood draw.  However, she is willing to self-test at home.  She reports that in the past we have explored this option and her insurance did not cover it.  We will retry this again and appeal the decision if needed. -Screening echocardiogram last in 2021.  Will need to repeat this. -INR today 3.3.  She will decrease her dosing to 5 mg on Monday and Friday and 7.5 mg the other days.  #Cardiac risk counseling -Left heart cath prior to surgery on 10/28/2021 with no CAD.  Follow-up in office in 1 month for INR check and echo.  Patient is off of work on Mondays, will try to coordinate visits for this today. [EKG obtained to assist in diagnosis and management of assessed problem(s)] : EKG obtained to assist in diagnosis and management of assessed problem(s)

## 2024-02-22 NOTE — ASSESSMENT
[FreeTextEntry1] : 34 yo F with history of severe aortic stenosis s/p MVR and ascending arch repair (ON-X 19) 11/30/21 at [facility] by Dr. Munguia that was c/b complete heart block presents today for follow-up.

## 2024-02-22 NOTE — PHYSICAL EXAM
[Normal] : normal gait [No Edema] : no edema [No Rash] : no rash [Moves all extremities] : moves all extremities [Alert and Oriented] : alert and oriented [de-identified] : Healed median sternotomy scar, mechanical S2, grade 3 out of 6 systolic murmur heard best at the left and right upper sternal borders. [de-identified] : Warm

## 2024-04-01 ENCOUNTER — APPOINTMENT (OUTPATIENT)
Dept: CARDIOLOGY | Facility: CLINIC | Age: 34
End: 2024-04-01

## 2024-04-10 ENCOUNTER — APPOINTMENT (OUTPATIENT)
Dept: CARDIOLOGY | Facility: CLINIC | Age: 34
End: 2024-04-10

## 2024-04-18 ENCOUNTER — APPOINTMENT (OUTPATIENT)
Dept: CARDIOLOGY | Facility: CLINIC | Age: 34
End: 2024-04-18

## 2024-04-18 LAB
INR PPP: 1.9 RATIO
QUALITY CONTROL: YES

## 2024-04-29 ENCOUNTER — APPOINTMENT (OUTPATIENT)
Dept: CARDIOLOGY | Facility: CLINIC | Age: 34
End: 2024-04-29

## 2024-06-07 ENCOUNTER — APPOINTMENT (OUTPATIENT)
Dept: CARDIOLOGY | Facility: CLINIC | Age: 34
End: 2024-06-07

## 2024-08-12 LAB — INR PPP: 2.5 RATIO

## 2024-11-15 ENCOUNTER — APPOINTMENT (OUTPATIENT)
Dept: CARDIOLOGY | Facility: CLINIC | Age: 34
End: 2024-11-15
Payer: COMMERCIAL

## 2024-11-15 VITALS
BODY MASS INDEX: 30.18 KG/M2 | WEIGHT: 165 LBS | OXYGEN SATURATION: 98 % | SYSTOLIC BLOOD PRESSURE: 122 MMHG | DIASTOLIC BLOOD PRESSURE: 62 MMHG | HEART RATE: 68 BPM

## 2024-11-15 DIAGNOSIS — Q23.81 CONGENITAL STENOSIS OF AORTIC VALVE: ICD-10-CM

## 2024-11-15 DIAGNOSIS — Z79.01 LONG TERM (CURRENT) USE OF ANTICOAGULANTS: ICD-10-CM

## 2024-11-15 DIAGNOSIS — Q23.0 CONGENITAL STENOSIS OF AORTIC VALVE: ICD-10-CM

## 2024-11-15 LAB
INR PPP: 1.1 RATIO
QUALITY CONTROL: YES

## 2024-11-15 PROCEDURE — 93306 TTE W/DOPPLER COMPLETE: CPT

## 2024-11-15 PROCEDURE — 85610 PROTHROMBIN TIME: CPT | Mod: QW

## 2024-11-15 PROCEDURE — 99213 OFFICE O/P EST LOW 20 MIN: CPT

## 2024-11-15 PROCEDURE — ZZZZZ: CPT

## 2024-11-15 RX ORDER — ENOXAPARIN SODIUM 80 MG/.8ML
80 INJECTION, SOLUTION SUBCUTANEOUS
Qty: 14 | Refills: 0 | Status: ACTIVE | COMMUNITY
Start: 2024-11-15 | End: 1900-01-01

## 2024-11-21 ENCOUNTER — APPOINTMENT (OUTPATIENT)
Dept: CARDIOLOGY | Facility: CLINIC | Age: 34
End: 2024-11-21
Payer: COMMERCIAL

## 2024-11-21 LAB
INR PPP: 1.4 RATIO
QUALITY CONTROL: YES

## 2024-11-21 PROCEDURE — 93793 ANTICOAG MGMT PT WARFARIN: CPT

## 2024-11-21 PROCEDURE — 85610 PROTHROMBIN TIME: CPT | Mod: QW

## 2024-11-29 ENCOUNTER — APPOINTMENT (OUTPATIENT)
Dept: CARDIOLOGY | Facility: CLINIC | Age: 34
End: 2024-11-29
Payer: COMMERCIAL

## 2024-11-29 LAB
INR PPP: 4.5 RATIO
QUALITY CONTROL: YES

## 2024-11-29 PROCEDURE — 85610 PROTHROMBIN TIME: CPT | Mod: QW

## 2024-11-29 PROCEDURE — 93793 ANTICOAG MGMT PT WARFARIN: CPT

## 2024-12-04 ENCOUNTER — APPOINTMENT (OUTPATIENT)
Dept: CARDIOLOGY | Facility: CLINIC | Age: 34
End: 2024-12-04

## 2025-02-10 ENCOUNTER — APPOINTMENT (OUTPATIENT)
Dept: CARDIOLOGY | Facility: CLINIC | Age: 35
End: 2025-02-10
Payer: COMMERCIAL

## 2025-02-10 ENCOUNTER — NON-APPOINTMENT (OUTPATIENT)
Age: 35
End: 2025-02-10

## 2025-02-10 VITALS
HEART RATE: 70 BPM | BODY MASS INDEX: 28.88 KG/M2 | SYSTOLIC BLOOD PRESSURE: 122 MMHG | DIASTOLIC BLOOD PRESSURE: 72 MMHG | WEIGHT: 163 LBS | HEIGHT: 63 IN | OXYGEN SATURATION: 98 %

## 2025-02-10 DIAGNOSIS — Z95.2 PRESENCE OF PROSTHETIC HEART VALVE: ICD-10-CM

## 2025-02-10 DIAGNOSIS — Z79.01 LONG TERM (CURRENT) USE OF ANTICOAGULANTS: ICD-10-CM

## 2025-02-10 DIAGNOSIS — Q23.81 NONRHEUMATIC AORTIC (VALVE) STENOSIS: ICD-10-CM

## 2025-02-10 DIAGNOSIS — I35.0 NONRHEUMATIC AORTIC (VALVE) STENOSIS: ICD-10-CM

## 2025-02-10 PROCEDURE — 99214 OFFICE O/P EST MOD 30 MIN: CPT

## 2025-02-10 PROCEDURE — G2211 COMPLEX E/M VISIT ADD ON: CPT | Mod: NC

## 2025-02-10 PROCEDURE — 93000 ELECTROCARDIOGRAM COMPLETE: CPT

## 2025-02-13 ENCOUNTER — APPOINTMENT (OUTPATIENT)
Dept: CARDIOLOGY | Facility: CLINIC | Age: 35
End: 2025-02-13

## 2025-05-13 ENCOUNTER — APPOINTMENT (OUTPATIENT)
Dept: CARDIOLOGY | Facility: CLINIC | Age: 35
End: 2025-05-13